# Patient Record
Sex: FEMALE | HISPANIC OR LATINO | Employment: UNEMPLOYED | URBAN - METROPOLITAN AREA
[De-identification: names, ages, dates, MRNs, and addresses within clinical notes are randomized per-mention and may not be internally consistent; named-entity substitution may affect disease eponyms.]

---

## 2022-08-25 ENCOUNTER — APPOINTMENT (OUTPATIENT)
Dept: ULTRASOUND IMAGING | Facility: CLINIC | Age: 27
End: 2022-08-25
Attending: PHYSICIAN ASSISTANT
Payer: COMMERCIAL

## 2022-08-25 ENCOUNTER — HOSPITAL ENCOUNTER (EMERGENCY)
Facility: CLINIC | Age: 27
Discharge: HOME | End: 2022-08-25
Attending: PHYSICIAN ASSISTANT | Admitting: PHYSICIAN ASSISTANT
Payer: COMMERCIAL

## 2022-08-25 VITALS
WEIGHT: 293 LBS | DIASTOLIC BLOOD PRESSURE: 77 MMHG | TEMPERATURE: 98.9 F | OXYGEN SATURATION: 98 % | RESPIRATION RATE: 18 BRPM | SYSTOLIC BLOOD PRESSURE: 117 MMHG | HEART RATE: 104 BPM

## 2022-08-25 DIAGNOSIS — R10.30 PREGNANCY WITH SUPRAPUBIC CRAMPING, ANTEPARTUM: ICD-10-CM

## 2022-08-25 DIAGNOSIS — O99.891 PREGNANCY WITH SUPRAPUBIC CRAMPING, ANTEPARTUM: ICD-10-CM

## 2022-08-25 DIAGNOSIS — Z33.1 PREGNANCY, INCIDENTAL: ICD-10-CM

## 2022-08-25 LAB
ABO/RH(D): NORMAL
ALBUMIN SERPL-MCNC: 3.4 G/DL (ref 3.4–5)
ALBUMIN UR-MCNC: NEGATIVE MG/DL
ALP SERPL-CCNC: 55 U/L (ref 40–150)
ALT SERPL W P-5'-P-CCNC: 23 U/L (ref 0–50)
ANION GAP SERPL CALCULATED.3IONS-SCNC: 5 MMOL/L (ref 3–14)
ANTIBODY SCREEN: NEGATIVE
APPEARANCE UR: CLEAR
AST SERPL W P-5'-P-CCNC: 24 U/L (ref 0–45)
B-HCG SERPL-ACNC: ABNORMAL IU/L (ref 0–5)
BASOPHILS # BLD AUTO: 0 10E3/UL (ref 0–0.2)
BASOPHILS NFR BLD AUTO: 0 %
BILIRUB SERPL-MCNC: 0.4 MG/DL (ref 0.2–1.3)
BILIRUB UR QL STRIP: NEGATIVE
BUN SERPL-MCNC: 8 MG/DL (ref 7–30)
CALCIUM SERPL-MCNC: 8.9 MG/DL (ref 8.5–10.1)
CHLORIDE BLD-SCNC: 109 MMOL/L (ref 94–109)
CO2 SERPL-SCNC: 22 MMOL/L (ref 20–32)
COLOR UR AUTO: YELLOW
CREAT SERPL-MCNC: 0.52 MG/DL (ref 0.52–1.04)
EOSINOPHIL # BLD AUTO: 0.2 10E3/UL (ref 0–0.7)
EOSINOPHIL NFR BLD AUTO: 3 %
ERYTHROCYTE [DISTWIDTH] IN BLOOD BY AUTOMATED COUNT: 12.5 % (ref 10–15)
GFR SERPL CREATININE-BSD FRML MDRD: >90 ML/MIN/1.73M2
GLUCOSE BLD-MCNC: 89 MG/DL (ref 70–99)
GLUCOSE UR STRIP-MCNC: NEGATIVE MG/DL
HCT VFR BLD AUTO: 38.2 % (ref 35–47)
HGB BLD-MCNC: 13 G/DL (ref 11.7–15.7)
HGB UR QL STRIP: ABNORMAL
HOLD SPECIMEN: NORMAL
IMM GRANULOCYTES # BLD: 0 10E3/UL
IMM GRANULOCYTES NFR BLD: 1 %
KETONES UR STRIP-MCNC: NEGATIVE MG/DL
LEUKOCYTE ESTERASE UR QL STRIP: NEGATIVE
LYMPHOCYTES # BLD AUTO: 2.4 10E3/UL (ref 0.8–5.3)
LYMPHOCYTES NFR BLD AUTO: 36 %
MCH RBC QN AUTO: 30.2 PG (ref 26.5–33)
MCHC RBC AUTO-ENTMCNC: 34 G/DL (ref 31.5–36.5)
MCV RBC AUTO: 89 FL (ref 78–100)
MONOCYTES # BLD AUTO: 0.6 10E3/UL (ref 0–1.3)
MONOCYTES NFR BLD AUTO: 9 %
MUCOUS THREADS #/AREA URNS LPF: PRESENT /LPF
NEUTROPHILS # BLD AUTO: 3.3 10E3/UL (ref 1.6–8.3)
NEUTROPHILS NFR BLD AUTO: 51 %
NITRATE UR QL: NEGATIVE
NRBC # BLD AUTO: 0 10E3/UL
NRBC BLD AUTO-RTO: 0 /100
PH UR STRIP: 6 [PH] (ref 5–7)
PLATELET # BLD AUTO: 255 10E3/UL (ref 150–450)
POTASSIUM BLD-SCNC: 4.1 MMOL/L (ref 3.4–5.3)
PROT SERPL-MCNC: 7.2 G/DL (ref 6.8–8.8)
RBC # BLD AUTO: 4.31 10E6/UL (ref 3.8–5.2)
RBC URINE: 1 /HPF
SODIUM SERPL-SCNC: 136 MMOL/L (ref 133–144)
SP GR UR STRIP: 1.01 (ref 1–1.03)
SPECIMEN EXPIRATION DATE: NORMAL
SQUAMOUS EPITHELIAL: 3 /HPF
UROBILINOGEN UR STRIP-MCNC: NORMAL MG/DL
WBC # BLD AUTO: 6.5 10E3/UL (ref 4–11)
WBC URINE: 1 /HPF

## 2022-08-25 PROCEDURE — 36415 COLL VENOUS BLD VENIPUNCTURE: CPT | Performed by: PHYSICIAN ASSISTANT

## 2022-08-25 PROCEDURE — 81001 URINALYSIS AUTO W/SCOPE: CPT | Performed by: PHYSICIAN ASSISTANT

## 2022-08-25 PROCEDURE — 80053 COMPREHEN METABOLIC PANEL: CPT | Performed by: PHYSICIAN ASSISTANT

## 2022-08-25 PROCEDURE — 99284 EMERGENCY DEPT VISIT MOD MDM: CPT | Mod: 25 | Performed by: PHYSICIAN ASSISTANT

## 2022-08-25 PROCEDURE — 85041 AUTOMATED RBC COUNT: CPT | Performed by: PHYSICIAN ASSISTANT

## 2022-08-25 PROCEDURE — 82040 ASSAY OF SERUM ALBUMIN: CPT | Performed by: PHYSICIAN ASSISTANT

## 2022-08-25 PROCEDURE — 86901 BLOOD TYPING SEROLOGIC RH(D): CPT | Performed by: PHYSICIAN ASSISTANT

## 2022-08-25 PROCEDURE — 76801 OB US < 14 WKS SINGLE FETUS: CPT

## 2022-08-25 PROCEDURE — 99284 EMERGENCY DEPT VISIT MOD MDM: CPT | Performed by: PHYSICIAN ASSISTANT

## 2022-08-25 PROCEDURE — 84702 CHORIONIC GONADOTROPIN TEST: CPT | Performed by: PHYSICIAN ASSISTANT

## 2022-08-25 ASSESSMENT — ACTIVITIES OF DAILY LIVING (ADL)
ADLS_ACUITY_SCORE: 35
ADLS_ACUITY_SCORE: 33

## 2022-08-25 NOTE — ED TRIAGE NOTES
Pt presents 8 wks pregnant with abdominal cramps, pain 2/10. States brownish stringy discharge, and brown streaks with wiping. Pt states symptoms started Tuesday. Mild SOB related to asthma and seasonal allergies.

## 2022-08-25 NOTE — DISCHARGE INSTRUCTIONS
It was a pleasure working with you today!  I hope your condition improves rapidly!     Thankfully, all of your testing was very reassuring and looked great.  No baby complications at this time.  Please rest the next couple days.  Avoid lifting anything over 5 pounds.  Avoid any aerobic exercise.  Your uterus is likely responding to recent baby growth and you are experiencing some round ligament type discomfort.  Please return to the emergency department if you get vaginal bleeding, increasing pain, fever, or chills.

## 2022-08-26 NOTE — ED PROVIDER NOTES
History     Chief Complaint   Patient presents with     Abdominal Pain     HPI  Modesta Bill is a 26 year old female currently about 8 weeks gestation based on LMP of 6/28/2022 who presents for evaluation of 2.5 days of minor lower abdominal cramping and occasional small specks of brown discharge with wiping during urination.  She denies any bleeding from the vagina.  She has had some first trimester nausea but no vomiting.  She denies any recent fevers or chills.  Denies any consistent abdominal pain.  She denies any dysuria, frequency, urgency, flank pain, or gross hematuria.  Last bowel movement was last night and she generally goes daily or twice daily.  She is G2, P0.  First pregnancy ended in the very early trimester due to miscarriage.  She is unsure what blood type she has.  She and her significant other were not trying for pregnancy, but also were not preventing against it.  She has not taken any medication to treat her symptoms.      OB History   No obstetric history on file.        Allergies:  Allergies   Allergen Reactions     Hydrocodone-Acetaminophen Dizziness       Problem List:    There are no problems to display for this patient.       Past Medical History:    No past medical history on file.    Past Surgical History:    No past surgical history on file.    Family History:    No family history on file.    Social History:  Marital Status:  Single [1]        Medications:    No current outpatient medications on file.        Review of Systems   All other systems reviewed and are negative.      Physical Exam   BP: 114/88  Pulse: 104  Temp: 98.9  F (37.2  C)  Resp: 18  Weight: 133.8 kg (294 lb 14.4 oz)  SpO2: 98 %      Physical Exam  Vitals and nursing note reviewed.   Constitutional:       General: She is not in acute distress.     Appearance: She is not diaphoretic.   HENT:      Head: Normocephalic and atraumatic.      Right Ear: External ear normal.      Left Ear: External ear normal.       Nose: Nose normal.      Mouth/Throat:      Pharynx: No oropharyngeal exudate.   Eyes:      General: No scleral icterus.        Right eye: No discharge.         Left eye: No discharge.      Conjunctiva/sclera: Conjunctivae normal.      Pupils: Pupils are equal, round, and reactive to light.   Neck:      Thyroid: No thyromegaly.   Cardiovascular:      Rate and Rhythm: Normal rate and regular rhythm.      Heart sounds: Normal heart sounds. No murmur heard.  Pulmonary:      Effort: Pulmonary effort is normal. No respiratory distress.      Breath sounds: Normal breath sounds. No wheezing or rales.   Chest:      Chest wall: No tenderness.   Abdominal:      General: Bowel sounds are normal. There is no distension.      Palpations: Abdomen is soft. There is no mass.      Tenderness: There is no abdominal tenderness. There is no right CVA tenderness, left CVA tenderness, guarding or rebound. Negative signs include Flores's sign and Rovsing's sign.      Hernia: There is no hernia in the umbilical area or ventral area.   Musculoskeletal:         General: No tenderness or deformity. Normal range of motion.      Cervical back: Normal range of motion and neck supple.   Lymphadenopathy:      Cervical: No cervical adenopathy.   Skin:     General: Skin is warm and dry.      Capillary Refill: Capillary refill takes less than 2 seconds.      Findings: No erythema or rash.   Neurological:      Mental Status: She is alert and oriented to person, place, and time.      Cranial Nerves: No cranial nerve deficit.   Psychiatric:         Behavior: Behavior normal.         Thought Content: Thought content normal.         ED Course                 Procedures              Critical Care time:  none               Results for orders placed or performed during the hospital encounter of 08/25/22 (from the past 24 hour(s))   CBC with platelets differential    Narrative    The following orders were created for panel order CBC with platelets  differential.  Procedure                               Abnormality         Status                     ---------                               -----------         ------                     CBC with platelets and d...[[id number]]                      Final result                 Please view results for these tests on the individual orders.   Comprehensive metabolic panel   Result Value Ref Range    Sodium 136 133 - 144 mmol/L    Potassium 4.1 3.4 - 5.3 mmol/L    Chloride 109 94 - 109 mmol/L    Carbon Dioxide (CO2) 22 20 - 32 mmol/L    Anion Gap 5 3 - 14 mmol/L    Urea Nitrogen 8 7 - 30 mg/dL    Creatinine 0.52 0.52 - 1.04 mg/dL    Calcium 8.9 8.5 - 10.1 mg/dL    Glucose 89 70 - 99 mg/dL    Alkaline Phosphatase 55 40 - 150 U/L    AST 24 0 - 45 U/L    ALT 23 0 - 50 U/L    Protein Total 7.2 6.8 - 8.8 g/dL    Albumin 3.4 3.4 - 5.0 g/dL    Bilirubin Total 0.4 0.2 - 1.3 mg/dL    GFR Estimate >90 >60 mL/min/1.73m2   HCG quantitative pregnancy   Result Value Ref Range    hCG Quantitative 68,607 (H) 0 - 5 IU/L   CBC with platelets and differential   Result Value Ref Range    WBC Count 6.5 4.0 - 11.0 10e3/uL    RBC Count 4.31 3.80 - 5.20 10e6/uL    Hemoglobin 13.0 11.7 - 15.7 g/dL    Hematocrit 38.2 35.0 - 47.0 %    MCV 89 78 - 100 fL    MCH 30.2 26.5 - 33.0 pg    MCHC 34.0 31.5 - 36.5 g/dL    RDW 12.5 10.0 - 15.0 %    Platelet Count 255 150 - 450 10e3/uL    % Neutrophils 51 %    % Lymphocytes 36 %    % Monocytes 9 %    % Eosinophils 3 %    % Basophils 0 %    % Immature Granulocytes 1 %    NRBCs per 100 WBC 0 <1 /100    Absolute Neutrophils 3.3 1.6 - 8.3 10e3/uL    Absolute Lymphocytes 2.4 0.8 - 5.3 10e3/uL    Absolute Monocytes 0.6 0.0 - 1.3 10e3/uL    Absolute Eosinophils 0.2 0.0 - 0.7 10e3/uL    Absolute Basophils 0.0 0.0 - 0.2 10e3/uL    Absolute Immature Granulocytes 0.0 <=0.4 10e3/uL    Absolute NRBCs 0.0 10e3/uL   UA with Microscopic reflex to Culture    Specimen: Urine, Clean Catch   Result Value Ref Range    Color  Urine Yellow Colorless, Straw, Light Yellow, Yellow    Appearance Urine Clear Clear    Glucose Urine Negative Negative mg/dL    Bilirubin Urine Negative Negative    Ketones Urine Negative Negative mg/dL    Specific Gravity Urine 1.010 1.003 - 1.035    Blood Urine Trace (A) Negative    pH Urine 6.0 5.0 - 7.0    Protein Albumin Urine Negative Negative mg/dL    Urobilinogen Urine Normal Normal, 2.0 mg/dL    Nitrite Urine Negative Negative    Leukocyte Esterase Urine Negative Negative    Mucus Urine Present (A) None Seen /LPF    RBC Urine 1 <=2 /HPF    WBC Urine 1 <=5 /HPF    Squamous Epithelials Urine 3 (H) <=1 /HPF    Narrative    Urine Culture not indicated   ABO/Rh type and screen    Narrative    The following orders were created for panel order ABO/Rh type and screen.  Procedure                               Abnormality         Status                     ---------                               -----------         ------                     Adult Type and Screen[[id number]]                            Final result                 Please view results for these tests on the individual orders.   Adult Type and Screen   Result Value Ref Range    ABO/RH(D) O POS     Antibody Screen Negative Negative    SPECIMEN EXPIRATION DATE [id number]    Extra Tube    Narrative    The following orders were created for panel order Extra Tube.  Procedure                               Abnormality         Status                     ---------                               -----------         ------                     Extra Purple Top Tube[[id number]]                            Final result                 Please view results for these tests on the individual orders.   Extra Purple Top Tube   Result Value Ref Range    Hold Specimen [facility]    US OB <14 Weeks W Transvaginal    Narrative    US OB <14 WEEKS WITH TRANSVAGINAL SINGLE 8/25/2022 12:15 PM    CLINICAL HISTORY: cramping, change in discharge, 8 weeks gestation by  LMP  TECHNIQUE:  Transabdominal scans were performed. Endovaginal ultrasound  was performed to better visualize the embryo.    COMPARISON: None.    FINDINGS:  UTERUS: Single normal appearing intrauterine gestation sac.  CRL: measures 17 mm, equals 8 weeks 2 days.  FETAL HEART RATE: 153 bpm.  AMNIOTIC FLUID: Normal.  PLACENTA: Not yet formed. No evidence for sub-chorionic hemorrhage.    RIGHT OVARY: Normal.  LEFT OVARY: Normal and contains a corpus luteum.      Impression    IMPRESSION:   1.  Single living intrauterine gestation at 8 weeks 2 days, EDC  04/04/2022.      EVA JORDAN MD         SYSTEM ID:  [id number]       Medications - No data to display    Assessments & Plan (with Medical Decision Making)     Pregnancy, incidental  Pregnancy with suprapubic cramping, antepartum     26 year old female currently 8 weeks 2 days gestation according to LMP who presents for evaluation of minor bilateral lower  abdominal cramping and a couple episodes of some slight brown color to her vaginal mucus recently.  Nausea in her first trimester but no vomiting.  No other abnormal symptoms.  See HPI above for details.  Unsure of her blood type.  On exam blood pressure 117/77, temperature 98.9, pulse 104, respiration 18, oxygen saturation 98% on room air.  Patient is in no acute distress.  No abdominal discomfort.  No abnormalities on exam otherwise as noted above.  IV was established.  Laboratory levels all stable with normal CBC, comprehensive metabolic panel, appropriately elevated hCG at 68,000 607, and urinalysis.  She is O+ blood type with negative antibodies.  Ultrasound with single living intrauterine gestation at 8 weeks 2 days.  No subchorionic hemorrhage.  No abnormalities.  Patient was reassured.  This likely represents round ligament discomfort.  She has a viable pregnancy.  She did show me a picture of the brown discharge that she was experiencing, and I think this is more likely physiologic discharge.  There is no sign of bleeding.   Reasons for ED return reviewed.  She does have a follow-up appointment with her OB provider in 2 weeks and she will keep that appointment.  Patient was in agreement with this plan was suitable for discharge.     I have reviewed the nursing notes.    I have reviewed the findings, diagnosis, plan and need for follow up with the patient.       There are no discharge medications for this patient.      Final diagnoses:   Pregnancy, incidental   Pregnancy with suprapubic cramping, antepartum     Disclaimer: This note consists of symbols derived from keyboarding, dictation and/or voice recognition software. As a result, there may be errors in the script that have gone undetected. Please consider this when interpreting information found in this chart.      8/25/2022   [facility] EMERGENCY DEPT     Hari Rees PA-C  08/25/22 [id number]

## 2022-11-17 ENCOUNTER — TRANSFERRED RECORDS (OUTPATIENT)
Dept: HEALTH INFORMATION MANAGEMENT | Facility: CLINIC | Age: 27
End: 2022-11-17

## 2023-02-20 ENCOUNTER — TRANSFERRED RECORDS (OUTPATIENT)
Dept: HEALTH INFORMATION MANAGEMENT | Facility: CLINIC | Age: 28
End: 2023-02-20

## 2023-04-06 ENCOUNTER — TRANSFERRED RECORDS (OUTPATIENT)
Dept: HEALTH INFORMATION MANAGEMENT | Facility: CLINIC | Age: 28
End: 2023-04-06

## 2023-04-22 ENCOUNTER — ANESTHESIA EVENT (OUTPATIENT)
Dept: OBGYN | Facility: CLINIC | Age: 28
End: 2023-04-22
Payer: COMMERCIAL

## 2023-04-22 ENCOUNTER — ANESTHESIA (OUTPATIENT)
Dept: OBGYN | Facility: CLINIC | Age: 28
End: 2023-04-22
Payer: COMMERCIAL

## 2023-04-22 ENCOUNTER — HOSPITAL ENCOUNTER (INPATIENT)
Facility: CLINIC | Age: 28
LOS: 2 days | Discharge: OTHER INSTITUTION | End: 2023-04-24
Attending: FAMILY MEDICINE | Admitting: FAMILY MEDICINE
Payer: COMMERCIAL

## 2023-04-22 ENCOUNTER — TRANSFERRED RECORDS (OUTPATIENT)
Dept: HEALTH INFORMATION MANAGEMENT | Facility: CLINIC | Age: 28
End: 2023-04-22

## 2023-04-22 DIAGNOSIS — O48.0 POST TERM PREGNANCY, ANTEPARTUM CONDITION OR COMPLICATION: ICD-10-CM

## 2023-04-22 DIAGNOSIS — E66.01 MORBID OBESITY (H): ICD-10-CM

## 2023-04-22 DIAGNOSIS — O14.90 PRE-ECLAMPSIA AFFECTING PREGNANCY, ANTEPARTUM: Primary | ICD-10-CM

## 2023-04-22 DIAGNOSIS — O42.90 PROLONGED RUPTURE OF MEMBRANES: ICD-10-CM

## 2023-04-22 PROBLEM — Z34.90 TERM PREGNANCY: Status: RESOLVED | Noted: 2023-04-22 | Resolved: 2023-04-22

## 2023-04-22 PROBLEM — Z34.90 TERM PREGNANCY: Status: ACTIVE | Noted: 2023-04-22

## 2023-04-22 LAB
ABO/RH(D): NORMAL
ALBUMIN MFR UR ELPH: 12.3 MG/DL (ref 1–14)
ALBUMIN SERPL BCG-MCNC: 3.2 G/DL (ref 3.5–5.2)
ALBUMIN SERPL BCG-MCNC: 3.3 G/DL (ref 3.5–5.2)
ALP SERPL-CCNC: 273 U/L (ref 35–104)
ALP SERPL-CCNC: 305 U/L (ref 35–104)
ALT SERPL W P-5'-P-CCNC: 12 U/L (ref 10–35)
ALT SERPL W P-5'-P-CCNC: 14 U/L (ref 10–35)
ANION GAP SERPL CALCULATED.3IONS-SCNC: 14 MMOL/L (ref 7–15)
ANION GAP SERPL CALCULATED.3IONS-SCNC: 15 MMOL/L (ref 7–15)
ANTIBODY SCREEN: NEGATIVE
AST SERPL W P-5'-P-CCNC: 21 U/L (ref 10–35)
AST SERPL W P-5'-P-CCNC: 22 U/L (ref 10–35)
BILIRUB SERPL-MCNC: 0.2 MG/DL
BILIRUB SERPL-MCNC: 0.3 MG/DL
BUN SERPL-MCNC: 10.3 MG/DL (ref 6–20)
BUN SERPL-MCNC: 10.9 MG/DL (ref 6–20)
CALCIUM SERPL-MCNC: 9.5 MG/DL (ref 8.6–10)
CALCIUM SERPL-MCNC: 9.8 MG/DL (ref 8.6–10)
CHLORIDE SERPL-SCNC: 102 MMOL/L (ref 98–107)
CHLORIDE SERPL-SCNC: 104 MMOL/L (ref 98–107)
CREAT SERPL-MCNC: 0.8 MG/DL (ref 0.51–0.95)
CREAT SERPL-MCNC: 0.8 MG/DL (ref 0.51–0.95)
CREAT UR-MCNC: 55.9 MG/DL
DEPRECATED HCO3 PLAS-SCNC: 18 MMOL/L (ref 22–29)
DEPRECATED HCO3 PLAS-SCNC: 19 MMOL/L (ref 22–29)
ERYTHROCYTE [DISTWIDTH] IN BLOOD BY AUTOMATED COUNT: 13.9 % (ref 10–15)
GFR SERPL CREATININE-BSD FRML MDRD: >90 ML/MIN/1.73M2
GFR SERPL CREATININE-BSD FRML MDRD: >90 ML/MIN/1.73M2
GLUCOSE SERPL-MCNC: 72 MG/DL (ref 70–99)
GLUCOSE SERPL-MCNC: 89 MG/DL (ref 70–99)
HCT VFR BLD AUTO: 36 % (ref 35–47)
HGB BLD-MCNC: 12 G/DL (ref 11.7–15.7)
MAGNESIUM SERPL-MCNC: 1.9 MG/DL (ref 1.7–2.3)
MCH RBC QN AUTO: 29.6 PG (ref 26.5–33)
MCHC RBC AUTO-ENTMCNC: 33.3 G/DL (ref 31.5–36.5)
MCV RBC AUTO: 89 FL (ref 78–100)
PLATELET # BLD AUTO: 196 10E3/UL (ref 150–450)
POTASSIUM SERPL-SCNC: 3.9 MMOL/L (ref 3.4–5.3)
POTASSIUM SERPL-SCNC: 4 MMOL/L (ref 3.4–5.3)
PROT SERPL-MCNC: 6.4 G/DL (ref 6.4–8.3)
PROT SERPL-MCNC: 6.9 G/DL (ref 6.4–8.3)
PROT/CREAT 24H UR: 0.22 MG/MG CR (ref 0–0.2)
RBC # BLD AUTO: 4.06 10E6/UL (ref 3.8–5.2)
SODIUM SERPL-SCNC: 134 MMOL/L (ref 136–145)
SODIUM SERPL-SCNC: 138 MMOL/L (ref 136–145)
SPECIMEN EXPIRATION DATE: NORMAL
WBC # BLD AUTO: 14.7 10E3/UL (ref 4–11)

## 2023-04-22 PROCEDURE — 250N000011 HC RX IP 250 OP 636: Performed by: FAMILY MEDICINE

## 2023-04-22 PROCEDURE — 120N000001 HC R&B MED SURG/OB

## 2023-04-22 PROCEDURE — C9290 INJ, BUPIVACAINE LIPOSOME: HCPCS | Performed by: NURSE ANESTHETIST, CERTIFIED REGISTERED

## 2023-04-22 PROCEDURE — 258N000003 HC RX IP 258 OP 636: Performed by: FAMILY MEDICINE

## 2023-04-22 PROCEDURE — 370N000017 HC ANESTHESIA TECHNICAL FEE, PER MIN: Performed by: FAMILY MEDICINE

## 2023-04-22 PROCEDURE — 250N000013 HC RX MED GY IP 250 OP 250 PS 637: Performed by: FAMILY MEDICINE

## 2023-04-22 PROCEDURE — 360N000076 HC SURGERY LEVEL 3, PER MIN: Performed by: FAMILY MEDICINE

## 2023-04-22 PROCEDURE — 86780 TREPONEMA PALLIDUM: CPT | Performed by: FAMILY MEDICINE

## 2023-04-22 PROCEDURE — 271N000001 HC OR GENERAL SUPPLY NON-STERILE: Performed by: FAMILY MEDICINE

## 2023-04-22 PROCEDURE — 272N000001 HC OR GENERAL SUPPLY STERILE: Performed by: FAMILY MEDICINE

## 2023-04-22 PROCEDURE — 59514 CESAREAN DELIVERY ONLY: CPT | Performed by: FAMILY MEDICINE

## 2023-04-22 PROCEDURE — 84155 ASSAY OF PROTEIN SERUM: CPT | Performed by: FAMILY MEDICINE

## 2023-04-22 PROCEDURE — 250N000011 HC RX IP 250 OP 636: Performed by: NURSE ANESTHETIST, CERTIFIED REGISTERED

## 2023-04-22 PROCEDURE — 59514 CESAREAN DELIVERY ONLY: CPT | Mod: 80 | Performed by: FAMILY MEDICINE

## 2023-04-22 PROCEDURE — 86901 BLOOD TYPING SEROLOGIC RH(D): CPT | Performed by: FAMILY MEDICINE

## 2023-04-22 PROCEDURE — 84450 TRANSFERASE (AST) (SGOT): CPT | Performed by: FAMILY MEDICINE

## 2023-04-22 PROCEDURE — 258N000003 HC RX IP 258 OP 636: Performed by: NURSE ANESTHETIST, CERTIFIED REGISTERED

## 2023-04-22 PROCEDURE — 710N000010 HC RECOVERY PHASE 1, LEVEL 2, PER MIN: Performed by: FAMILY MEDICINE

## 2023-04-22 PROCEDURE — 84156 ASSAY OF PROTEIN URINE: CPT | Performed by: FAMILY MEDICINE

## 2023-04-22 PROCEDURE — 86850 RBC ANTIBODY SCREEN: CPT | Performed by: FAMILY MEDICINE

## 2023-04-22 PROCEDURE — 83735 ASSAY OF MAGNESIUM: CPT | Performed by: FAMILY MEDICINE

## 2023-04-22 PROCEDURE — 36415 COLL VENOUS BLD VENIPUNCTURE: CPT | Performed by: FAMILY MEDICINE

## 2023-04-22 PROCEDURE — 85027 COMPLETE CBC AUTOMATED: CPT | Performed by: FAMILY MEDICINE

## 2023-04-22 PROCEDURE — 250N000009 HC RX 250: Performed by: NURSE ANESTHETIST, CERTIFIED REGISTERED

## 2023-04-22 RX ORDER — MISOPROSTOL 200 UG/1
400 TABLET ORAL
Status: DISCONTINUED | OUTPATIENT
Start: 2023-04-22 | End: 2023-04-22

## 2023-04-22 RX ORDER — HYDRALAZINE HYDROCHLORIDE 20 MG/ML
5-10 INJECTION INTRAMUSCULAR; INTRAVENOUS
Status: DISCONTINUED | OUTPATIENT
Start: 2023-04-22 | End: 2023-04-24 | Stop reason: HOSPADM

## 2023-04-22 RX ORDER — NALOXONE HYDROCHLORIDE 0.4 MG/ML
0.4 INJECTION, SOLUTION INTRAMUSCULAR; INTRAVENOUS; SUBCUTANEOUS
Status: DISCONTINUED | OUTPATIENT
Start: 2023-04-22 | End: 2023-04-22

## 2023-04-22 RX ORDER — PRENATAL VIT/IRON FUM/FOLIC AC 27MG-0.8MG
1 TABLET ORAL DAILY
Status: DISCONTINUED | OUTPATIENT
Start: 2023-04-23 | End: 2023-04-22

## 2023-04-22 RX ORDER — ALBUTEROL SULFATE 90 UG/1
2 AEROSOL, METERED RESPIRATORY (INHALATION) EVERY 6 HOURS PRN
COMMUNITY

## 2023-04-22 RX ORDER — KETOROLAC TROMETHAMINE 30 MG/ML
30 INJECTION, SOLUTION INTRAMUSCULAR; INTRAVENOUS
Status: DISCONTINUED | OUTPATIENT
Start: 2023-04-22 | End: 2023-04-22

## 2023-04-22 RX ORDER — MAGNESIUM SULFATE HEPTAHYDRATE 40 MG/ML
4 INJECTION, SOLUTION INTRAVENOUS ONCE
Status: COMPLETED | OUTPATIENT
Start: 2023-04-22 | End: 2023-04-22

## 2023-04-22 RX ORDER — CALCIUM GLUCONATE 94 MG/ML
1 INJECTION, SOLUTION INTRAVENOUS
Status: DISCONTINUED | OUTPATIENT
Start: 2023-04-22 | End: 2023-04-24 | Stop reason: HOSPADM

## 2023-04-22 RX ORDER — OXYTOCIN/0.9 % SODIUM CHLORIDE 30/500 ML
1-24 PLASTIC BAG, INJECTION (ML) INTRAVENOUS CONTINUOUS
Status: DISCONTINUED | OUTPATIENT
Start: 2023-04-22 | End: 2023-04-22

## 2023-04-22 RX ORDER — OXYTOCIN/0.9 % SODIUM CHLORIDE 30/500 ML
340 PLASTIC BAG, INJECTION (ML) INTRAVENOUS CONTINUOUS PRN
Status: DISCONTINUED | OUTPATIENT
Start: 2023-04-22 | End: 2023-04-22

## 2023-04-22 RX ORDER — OXYTOCIN 10 [USP'U]/ML
10 INJECTION, SOLUTION INTRAMUSCULAR; INTRAVENOUS
Status: DISCONTINUED | OUTPATIENT
Start: 2023-04-22 | End: 2023-04-22

## 2023-04-22 RX ORDER — NALOXONE HYDROCHLORIDE 0.4 MG/ML
0.2 INJECTION, SOLUTION INTRAMUSCULAR; INTRAVENOUS; SUBCUTANEOUS
Status: DISCONTINUED | OUTPATIENT
Start: 2023-04-22 | End: 2023-04-22

## 2023-04-22 RX ORDER — OXYTOCIN/0.9 % SODIUM CHLORIDE 30/500 ML
PLASTIC BAG, INJECTION (ML) INTRAVENOUS CONTINUOUS PRN
Status: DISCONTINUED | OUTPATIENT
Start: 2023-04-22 | End: 2023-04-22

## 2023-04-22 RX ORDER — IBUPROFEN 800 MG/1
800 TABLET, FILM COATED ORAL
Status: DISCONTINUED | OUTPATIENT
Start: 2023-04-22 | End: 2023-04-22

## 2023-04-22 RX ORDER — FENTANYL CITRATE 50 UG/ML
50 INJECTION, SOLUTION INTRAMUSCULAR; INTRAVENOUS EVERY 5 MIN PRN
Status: DISCONTINUED | OUTPATIENT
Start: 2023-04-22 | End: 2023-04-23

## 2023-04-22 RX ORDER — LIDOCAINE 40 MG/G
CREAM TOPICAL
Status: DISCONTINUED | OUTPATIENT
Start: 2023-04-22 | End: 2023-04-22

## 2023-04-22 RX ORDER — MAGNESIUM SULFATE HEPTAHYDRATE 40 MG/ML
2 INJECTION, SOLUTION INTRAVENOUS
Status: DISCONTINUED | OUTPATIENT
Start: 2023-04-22 | End: 2023-04-23

## 2023-04-22 RX ORDER — ONDANSETRON 2 MG/ML
4 INJECTION INTRAMUSCULAR; INTRAVENOUS EVERY 30 MIN PRN
Status: DISCONTINUED | OUTPATIENT
Start: 2023-04-22 | End: 2023-04-23

## 2023-04-22 RX ORDER — SODIUM CHLORIDE, SODIUM LACTATE, POTASSIUM CHLORIDE, CALCIUM CHLORIDE 600; 310; 30; 20 MG/100ML; MG/100ML; MG/100ML; MG/100ML
INJECTION, SOLUTION INTRAVENOUS CONTINUOUS
Status: DISCONTINUED | OUTPATIENT
Start: 2023-04-22 | End: 2023-04-22

## 2023-04-22 RX ORDER — METOCLOPRAMIDE HYDROCHLORIDE 5 MG/ML
10 INJECTION INTRAMUSCULAR; INTRAVENOUS EVERY 6 HOURS PRN
Status: DISCONTINUED | OUTPATIENT
Start: 2023-04-22 | End: 2023-04-22

## 2023-04-22 RX ORDER — ACETAMINOPHEN 325 MG/1
975 TABLET ORAL ONCE
Status: COMPLETED | OUTPATIENT
Start: 2023-04-22 | End: 2023-04-22

## 2023-04-22 RX ORDER — PENICILLIN G 3000000 [IU]/50ML
3 INJECTION, SOLUTION INTRAVENOUS EVERY 4 HOURS
Status: DISCONTINUED | OUTPATIENT
Start: 2023-04-22 | End: 2023-04-22

## 2023-04-22 RX ORDER — CITRIC ACID/SODIUM CITRATE 334-500MG
30 SOLUTION, ORAL ORAL
Status: DISCONTINUED | OUTPATIENT
Start: 2023-04-22 | End: 2023-04-22

## 2023-04-22 RX ORDER — OXYTOCIN/0.9 % SODIUM CHLORIDE 30/500 ML
100-340 PLASTIC BAG, INJECTION (ML) INTRAVENOUS CONTINUOUS PRN
Status: CANCELLED | OUTPATIENT
Start: 2023-04-22

## 2023-04-22 RX ORDER — METHYLERGONOVINE MALEATE 0.2 MG/ML
200 INJECTION INTRAVENOUS
Status: DISCONTINUED | OUTPATIENT
Start: 2023-04-22 | End: 2023-04-22

## 2023-04-22 RX ORDER — LABETALOL HYDROCHLORIDE 5 MG/ML
20-40 INJECTION, SOLUTION INTRAVENOUS EVERY 10 MIN PRN
Status: DISCONTINUED | OUTPATIENT
Start: 2023-04-22 | End: 2023-04-24 | Stop reason: HOSPADM

## 2023-04-22 RX ORDER — LABETALOL HYDROCHLORIDE 5 MG/ML
20-40 INJECTION, SOLUTION INTRAVENOUS EVERY 10 MIN PRN
Status: DISCONTINUED | OUTPATIENT
Start: 2023-04-22 | End: 2023-04-22

## 2023-04-22 RX ORDER — MAGNESIUM SULFATE IN WATER 40 MG/ML
2 INJECTION, SOLUTION INTRAVENOUS CONTINUOUS
Status: DISCONTINUED | OUTPATIENT
Start: 2023-04-23 | End: 2023-04-24 | Stop reason: HOSPADM

## 2023-04-22 RX ORDER — CARBOPROST TROMETHAMINE 250 UG/ML
250 INJECTION, SOLUTION INTRAMUSCULAR
Status: DISCONTINUED | OUTPATIENT
Start: 2023-04-22 | End: 2023-04-22

## 2023-04-22 RX ORDER — ONDANSETRON 2 MG/ML
INJECTION INTRAMUSCULAR; INTRAVENOUS PRN
Status: DISCONTINUED | OUTPATIENT
Start: 2023-04-22 | End: 2023-04-22

## 2023-04-22 RX ORDER — SODIUM CHLORIDE, SODIUM LACTATE, POTASSIUM CHLORIDE, CALCIUM CHLORIDE 600; 310; 30; 20 MG/100ML; MG/100ML; MG/100ML; MG/100ML
INJECTION, SOLUTION INTRAVENOUS CONTINUOUS
Status: DISCONTINUED | OUTPATIENT
Start: 2023-04-23 | End: 2023-04-23

## 2023-04-22 RX ORDER — FENTANYL CITRATE 50 UG/ML
INJECTION, SOLUTION INTRAMUSCULAR; INTRAVENOUS PRN
Status: DISCONTINUED | OUTPATIENT
Start: 2023-04-22 | End: 2023-04-22

## 2023-04-22 RX ORDER — MAGNESIUM SULFATE IN WATER 40 MG/ML
2 INJECTION, SOLUTION INTRAVENOUS CONTINUOUS
Status: DISCONTINUED | OUTPATIENT
Start: 2023-04-22 | End: 2023-04-23

## 2023-04-22 RX ORDER — FENTANYL CITRATE-0.9 % NACL/PF 10 MCG/ML
PLASTIC BAG, INJECTION (ML) INTRAVENOUS CONTINUOUS PRN
Status: DISCONTINUED | OUTPATIENT
Start: 2023-04-22 | End: 2023-04-22

## 2023-04-22 RX ORDER — CALCIUM CARBONATE 500 MG/1
500 TABLET, CHEWABLE ORAL DAILY PRN
Status: DISCONTINUED | OUTPATIENT
Start: 2023-04-22 | End: 2023-04-24 | Stop reason: HOSPADM

## 2023-04-22 RX ORDER — CITRIC ACID/SODIUM CITRATE 334-500MG
30 SOLUTION, ORAL ORAL
Status: COMPLETED | OUTPATIENT
Start: 2023-04-22 | End: 2023-04-22

## 2023-04-22 RX ORDER — PROCHLORPERAZINE 25 MG
25 SUPPOSITORY, RECTAL RECTAL EVERY 12 HOURS PRN
Status: DISCONTINUED | OUTPATIENT
Start: 2023-04-22 | End: 2023-04-22

## 2023-04-22 RX ORDER — HYDRALAZINE HYDROCHLORIDE 20 MG/ML
10 INJECTION INTRAMUSCULAR; INTRAVENOUS
Status: DISCONTINUED | OUTPATIENT
Start: 2023-04-22 | End: 2023-04-23

## 2023-04-22 RX ORDER — ONDANSETRON 4 MG/1
4 TABLET, ORALLY DISINTEGRATING ORAL EVERY 6 HOURS PRN
Status: DISCONTINUED | OUTPATIENT
Start: 2023-04-22 | End: 2023-04-22

## 2023-04-22 RX ORDER — BUPIVACAINE HYDROCHLORIDE 2.5 MG/ML
INJECTION, SOLUTION EPIDURAL; INFILTRATION; INTRACAUDAL PRN
Status: DISCONTINUED | OUTPATIENT
Start: 2023-04-22 | End: 2023-04-22

## 2023-04-22 RX ORDER — TRANEXAMIC ACID 10 MG/ML
1 INJECTION, SOLUTION INTRAVENOUS EVERY 30 MIN PRN
Status: DISCONTINUED | OUTPATIENT
Start: 2023-04-22 | End: 2023-04-22

## 2023-04-22 RX ORDER — HYDRALAZINE HYDROCHLORIDE 20 MG/ML
5-10 INJECTION INTRAMUSCULAR; INTRAVENOUS
Status: DISCONTINUED | OUTPATIENT
Start: 2023-04-22 | End: 2023-04-22

## 2023-04-22 RX ORDER — CEFAZOLIN SODIUM/WATER 3 G/30 ML
3 SYRINGE (ML) INTRAVENOUS
Status: COMPLETED | OUTPATIENT
Start: 2023-04-22 | End: 2023-04-22

## 2023-04-22 RX ORDER — PENICILLIN G POTASSIUM 5000000 [IU]/1
5 INJECTION, POWDER, FOR SOLUTION INTRAMUSCULAR; INTRAVENOUS ONCE
Status: COMPLETED | OUTPATIENT
Start: 2023-04-22 | End: 2023-04-22

## 2023-04-22 RX ORDER — OXYTOCIN 10 [USP'U]/ML
10 INJECTION, SOLUTION INTRAMUSCULAR; INTRAVENOUS
Status: CANCELLED | OUTPATIENT
Start: 2023-04-22

## 2023-04-22 RX ORDER — LABETALOL HYDROCHLORIDE 5 MG/ML
20-40 INJECTION, SOLUTION INTRAVENOUS EVERY 10 MIN PRN
Status: DISCONTINUED | OUTPATIENT
Start: 2023-04-22 | End: 2023-04-23

## 2023-04-22 RX ORDER — HYDROXYZINE HYDROCHLORIDE 25 MG/1
25 TABLET, FILM COATED ORAL EVERY 6 HOURS PRN
Status: DISCONTINUED | OUTPATIENT
Start: 2023-04-22 | End: 2023-04-23

## 2023-04-22 RX ORDER — DIMENHYDRINATE 50 MG/ML
25 INJECTION, SOLUTION INTRAMUSCULAR; INTRAVENOUS
Status: DISCONTINUED | OUTPATIENT
Start: 2023-04-22 | End: 2023-04-23

## 2023-04-22 RX ORDER — FENTANYL CITRATE 50 UG/ML
25 INJECTION, SOLUTION INTRAMUSCULAR; INTRAVENOUS EVERY 5 MIN PRN
Status: DISCONTINUED | OUTPATIENT
Start: 2023-04-22 | End: 2023-04-23

## 2023-04-22 RX ORDER — PROCHLORPERAZINE MALEATE 5 MG
10 TABLET ORAL EVERY 6 HOURS PRN
Status: DISCONTINUED | OUTPATIENT
Start: 2023-04-22 | End: 2023-04-22

## 2023-04-22 RX ORDER — CALCIUM GLUCONATE 94 MG/ML
1 INJECTION, SOLUTION INTRAVENOUS
Status: DISCONTINUED | OUTPATIENT
Start: 2023-04-22 | End: 2023-04-23

## 2023-04-22 RX ORDER — VITAMIN A ACETATE, .BETA.-CAROTENE, ASCORBIC ACID, CHOLECALCIFEROL, .ALPHA.-TOCOPHEROL ACETATE, DL-, THIAMINE MONONITRATE, RIBOFLAVIN, NIACINAMIDE, PYRIDOXINE HYDROCHLORIDE, FOLIC ACID, CYANOCOBALAMIN, CALCIUM CARBONATE, FERROUS FUMARATE, ZINC OXIDE, AND CUPRIC OXIDE 2000; 2000; 120; 400; 22; 1.84; 3; 20; 10; 1; 12; 200; 27; 25; 2 [IU]/1; [IU]/1; MG/1; [IU]/1; MG/1; MG/1; MG/1; MG/1; MG/1; MG/1; UG/1; MG/1; MG/1; MG/1; MG/1
1 TABLET ORAL DAILY
COMMUNITY

## 2023-04-22 RX ORDER — AZITHROMYCIN 500 MG/1
500 INJECTION, POWDER, LYOPHILIZED, FOR SOLUTION INTRAVENOUS
Status: COMPLETED | OUTPATIENT
Start: 2023-04-22 | End: 2023-04-22

## 2023-04-22 RX ORDER — SODIUM CHLORIDE, SODIUM LACTATE, POTASSIUM CHLORIDE, CALCIUM CHLORIDE 600; 310; 30; 20 MG/100ML; MG/100ML; MG/100ML; MG/100ML
INJECTION, SOLUTION INTRAVENOUS CONTINUOUS PRN
Status: DISCONTINUED | OUTPATIENT
Start: 2023-04-22 | End: 2023-04-22

## 2023-04-22 RX ORDER — CEFAZOLIN SODIUM/WATER 3 G/30 ML
3 SYRINGE (ML) INTRAVENOUS SEE ADMIN INSTRUCTIONS
Status: DISCONTINUED | OUTPATIENT
Start: 2023-04-22 | End: 2023-04-22

## 2023-04-22 RX ORDER — HYDRALAZINE HYDROCHLORIDE 20 MG/ML
10 INJECTION INTRAMUSCULAR; INTRAVENOUS
Status: DISCONTINUED | OUTPATIENT
Start: 2023-04-22 | End: 2023-04-24 | Stop reason: HOSPADM

## 2023-04-22 RX ORDER — OXYTOCIN 10 [USP'U]/ML
INJECTION, SOLUTION INTRAMUSCULAR; INTRAVENOUS PRN
Status: DISCONTINUED | OUTPATIENT
Start: 2023-04-22 | End: 2023-04-24 | Stop reason: HOSPADM

## 2023-04-22 RX ORDER — OXYTOCIN/0.9 % SODIUM CHLORIDE 30/500 ML
100-340 PLASTIC BAG, INJECTION (ML) INTRAVENOUS CONTINUOUS PRN
Status: DISCONTINUED | OUTPATIENT
Start: 2023-04-22 | End: 2023-04-22

## 2023-04-22 RX ORDER — MAGNESIUM SULFATE HEPTAHYDRATE 40 MG/ML
4 INJECTION, SOLUTION INTRAVENOUS
Status: DISCONTINUED | OUTPATIENT
Start: 2023-04-22 | End: 2023-04-23

## 2023-04-22 RX ORDER — ONDANSETRON 4 MG/1
4 TABLET, ORALLY DISINTEGRATING ORAL EVERY 30 MIN PRN
Status: DISCONTINUED | OUTPATIENT
Start: 2023-04-22 | End: 2023-04-23

## 2023-04-22 RX ORDER — BUPIVACAINE HYDROCHLORIDE 7.5 MG/ML
INJECTION, SOLUTION INTRASPINAL PRN
Status: DISCONTINUED | OUTPATIENT
Start: 2023-04-22 | End: 2023-04-22

## 2023-04-22 RX ORDER — BECLOMETHASONE DIPROPIONATE HFA 80 UG/1
1 AEROSOL, METERED RESPIRATORY (INHALATION) 2 TIMES DAILY
COMMUNITY

## 2023-04-22 RX ORDER — METOCLOPRAMIDE 5 MG/1
10 TABLET ORAL EVERY 6 HOURS PRN
Status: DISCONTINUED | OUTPATIENT
Start: 2023-04-22 | End: 2023-04-22

## 2023-04-22 RX ORDER — KETOROLAC TROMETHAMINE 30 MG/ML
INJECTION, SOLUTION INTRAMUSCULAR; INTRAVENOUS PRN
Status: DISCONTINUED | OUTPATIENT
Start: 2023-04-22 | End: 2023-04-22

## 2023-04-22 RX ORDER — MORPHINE SULFATE 1 MG/ML
INJECTION, SOLUTION EPIDURAL; INTRATHECAL; INTRAVENOUS PRN
Status: DISCONTINUED | OUTPATIENT
Start: 2023-04-22 | End: 2023-04-22

## 2023-04-22 RX ORDER — ONDANSETRON 2 MG/ML
4 INJECTION INTRAMUSCULAR; INTRAVENOUS EVERY 6 HOURS PRN
Status: DISCONTINUED | OUTPATIENT
Start: 2023-04-22 | End: 2023-04-22

## 2023-04-22 RX ORDER — MAGNESIUM SULFATE HEPTAHYDRATE 40 MG/ML
2 INJECTION, SOLUTION INTRAVENOUS
Status: DISCONTINUED | OUTPATIENT
Start: 2023-04-22 | End: 2023-04-24 | Stop reason: HOSPADM

## 2023-04-22 RX ORDER — HYDRALAZINE HYDROCHLORIDE 20 MG/ML
20 INJECTION INTRAMUSCULAR; INTRAVENOUS ONCE
Status: COMPLETED | OUTPATIENT
Start: 2023-04-22 | End: 2023-04-22

## 2023-04-22 RX ADMIN — Medication 50 MCG/MIN: at 22:12

## 2023-04-22 RX ADMIN — HYDRALAZINE HYDROCHLORIDE 5 MG: 20 INJECTION INTRAMUSCULAR; INTRAVENOUS at 20:14

## 2023-04-22 RX ADMIN — SODIUM CITRATE AND CITRIC ACID MONOHYDRATE 30 ML: 500; 334 SOLUTION ORAL at 21:46

## 2023-04-22 RX ADMIN — HYDRALAZINE HYDROCHLORIDE 20 MG: 20 INJECTION INTRAMUSCULAR; INTRAVENOUS at 21:22

## 2023-04-22 RX ADMIN — MAGNESIUM SULFATE HEPTAHYDRATE 4 G: 40 INJECTION, SOLUTION INTRAVENOUS at 20:52

## 2023-04-22 RX ADMIN — ACETAMINOPHEN 975 MG: 325 TABLET ORAL at 21:46

## 2023-04-22 RX ADMIN — BUPIVACAINE 10 ML: 13.3 INJECTION, SUSPENSION, LIPOSOMAL INFILTRATION at 23:33

## 2023-04-22 RX ADMIN — SODIUM CHLORIDE, POTASSIUM CHLORIDE, SODIUM LACTATE AND CALCIUM CHLORIDE: 600; 310; 30; 20 INJECTION, SOLUTION INTRAVENOUS at 19:41

## 2023-04-22 RX ADMIN — AZITHROMYCIN MONOHYDRATE 500 MG: 500 INJECTION, POWDER, LYOPHILIZED, FOR SOLUTION INTRAVENOUS at 21:33

## 2023-04-22 RX ADMIN — MORPHINE SULFATE 0.1 MG: 1 INJECTION EPIDURAL; INTRATHECAL; INTRAVENOUS at 22:12

## 2023-04-22 RX ADMIN — BUPIVACAINE 10 ML: 13.3 INJECTION, SUSPENSION, LIPOSOMAL INFILTRATION at 23:27

## 2023-04-22 RX ADMIN — FENTANYL CITRATE 10 MCG: 50 INJECTION, SOLUTION INTRAMUSCULAR; INTRAVENOUS at 22:12

## 2023-04-22 RX ADMIN — BUPIVACAINE HYDROCHLORIDE IN DEXTROSE 1.6 ML: 7.5 INJECTION, SOLUTION SUBARACHNOID at 22:12

## 2023-04-22 RX ADMIN — CALCIUM CARBONATE (ANTACID) CHEW TAB 500 MG 500 MG: 500 CHEW TAB at 20:44

## 2023-04-22 RX ADMIN — BUPIVACAINE HYDROCHLORIDE 20 ML: 2.5 INJECTION, SOLUTION EPIDURAL; INFILTRATION; INTRACAUDAL; PERINEURAL at 23:27

## 2023-04-22 RX ADMIN — SODIUM CHLORIDE, POTASSIUM CHLORIDE, SODIUM LACTATE AND CALCIUM CHLORIDE: 600; 310; 30; 20 INJECTION, SOLUTION INTRAVENOUS at 21:02

## 2023-04-22 RX ADMIN — KETOROLAC TROMETHAMINE 30 MG: 30 INJECTION, SOLUTION INTRAMUSCULAR at 23:18

## 2023-04-22 RX ADMIN — PENICILLIN G POTASSIUM 5 MILLION UNITS: 5000000 POWDER, FOR SOLUTION INTRAMUSCULAR; INTRAPLEURAL; INTRATHECAL; INTRAVENOUS at 19:39

## 2023-04-22 RX ADMIN — ONDANSETRON 4 MG: 2 INJECTION INTRAMUSCULAR; INTRAVENOUS at 22:39

## 2023-04-22 RX ADMIN — Medication 340 ML/HR: at 22:39

## 2023-04-22 RX ADMIN — BUPIVACAINE HYDROCHLORIDE 20 ML: 2.5 INJECTION, SOLUTION EPIDURAL; INFILTRATION; INTRACAUDAL; PERINEURAL at 23:33

## 2023-04-22 RX ADMIN — Medication 3 G: at 21:59

## 2023-04-22 RX ADMIN — HYDRALAZINE HYDROCHLORIDE 10 MG: 20 INJECTION INTRAMUSCULAR; INTRAVENOUS at 20:45

## 2023-04-22 RX ADMIN — MAGNESIUM SULFATE HEPTAHYDRATE 2 G/HR: 40 INJECTION, SOLUTION INTRAVENOUS at 21:29

## 2023-04-22 RX ADMIN — PHENYLEPHRINE HYDROCHLORIDE 100 MCG: 10 INJECTION INTRAVENOUS at 22:47

## 2023-04-22 ASSESSMENT — ACTIVITIES OF DAILY LIVING (ADL)
ADLS_ACUITY_SCORE: 18

## 2023-04-22 NOTE — PLAN OF CARE
"S:  Admission  B:  Modesta is a  @ 42w2d gestation, GBS unknown, Hep. B neg, pregnancy uncomplicated. EFW 9#4oz per US few days ago. Had SROM on  at 0020. Has been clear fluid, small amts, but with last vag exam, midwife noted glove full of mec . Has been laboring at home. Has asthma  A:  Patient admitted to room 331 for labor, post dates. Did not see any mec on pad pt was wearing, clear fluid. Ctx irregular, q2-5\", breathing with them. , mod variability, no accels, occas variable. /91  158/95    R: Dr. Henderson aware pt here, will evaluate                           "

## 2023-04-23 LAB
ANION GAP SERPL CALCULATED.3IONS-SCNC: 8 MMOL/L (ref 7–15)
BASOPHILS # BLD AUTO: 0 10E3/UL (ref 0–0.2)
BASOPHILS NFR BLD AUTO: 0 %
BUN SERPL-MCNC: 11.5 MG/DL (ref 6–20)
CALCIUM SERPL-MCNC: 8.2 MG/DL (ref 8.6–10)
CHLORIDE SERPL-SCNC: 101 MMOL/L (ref 98–107)
CREAT SERPL-MCNC: 0.87 MG/DL (ref 0.51–0.95)
DEPRECATED HCO3 PLAS-SCNC: 23 MMOL/L (ref 22–29)
EOSINOPHIL # BLD AUTO: 0.1 10E3/UL (ref 0–0.7)
EOSINOPHIL NFR BLD AUTO: 0 %
ERYTHROCYTE [DISTWIDTH] IN BLOOD BY AUTOMATED COUNT: 13.9 % (ref 10–15)
GFR SERPL CREATININE-BSD FRML MDRD: >90 ML/MIN/1.73M2
GLUCOSE SERPL-MCNC: 87 MG/DL (ref 70–99)
HCT VFR BLD AUTO: 28.1 % (ref 35–47)
HGB BLD-MCNC: 9.2 G/DL (ref 11.7–15.7)
IMM GRANULOCYTES # BLD: 0.1 10E3/UL
IMM GRANULOCYTES NFR BLD: 1 %
LYMPHOCYTES # BLD AUTO: 3.1 10E3/UL (ref 0.8–5.3)
LYMPHOCYTES NFR BLD AUTO: 25 %
MAGNESIUM SERPL-MCNC: 4.9 MG/DL (ref 1.7–2.3)
MCH RBC QN AUTO: 29.5 PG (ref 26.5–33)
MCHC RBC AUTO-ENTMCNC: 32.7 G/DL (ref 31.5–36.5)
MCV RBC AUTO: 90 FL (ref 78–100)
MONOCYTES # BLD AUTO: 0.8 10E3/UL (ref 0–1.3)
MONOCYTES NFR BLD AUTO: 6 %
NEUTROPHILS # BLD AUTO: 8.5 10E3/UL (ref 1.6–8.3)
NEUTROPHILS NFR BLD AUTO: 68 %
NRBC # BLD AUTO: 0 10E3/UL
NRBC BLD AUTO-RTO: 0 /100
PLATELET # BLD AUTO: 167 10E3/UL (ref 150–450)
POTASSIUM SERPL-SCNC: 4.1 MMOL/L (ref 3.4–5.3)
RBC # BLD AUTO: 3.12 10E6/UL (ref 3.8–5.2)
SODIUM SERPL-SCNC: 132 MMOL/L (ref 136–145)
T PALLIDUM AB SER QL: NONREACTIVE
WBC # BLD AUTO: 12.6 10E3/UL (ref 4–11)

## 2023-04-23 PROCEDURE — 36415 COLL VENOUS BLD VENIPUNCTURE: CPT | Performed by: FAMILY MEDICINE

## 2023-04-23 PROCEDURE — 250N000013 HC RX MED GY IP 250 OP 250 PS 637: Performed by: FAMILY MEDICINE

## 2023-04-23 PROCEDURE — 99231 SBSQ HOSP IP/OBS SF/LOW 25: CPT | Performed by: FAMILY MEDICINE

## 2023-04-23 PROCEDURE — 80048 BASIC METABOLIC PNL TOTAL CA: CPT | Performed by: FAMILY MEDICINE

## 2023-04-23 PROCEDURE — 85025 COMPLETE CBC W/AUTO DIFF WBC: CPT | Performed by: FAMILY MEDICINE

## 2023-04-23 PROCEDURE — 250N000009 HC RX 250: Performed by: FAMILY MEDICINE

## 2023-04-23 PROCEDURE — 250N000009 HC RX 250

## 2023-04-23 PROCEDURE — 83735 ASSAY OF MAGNESIUM: CPT | Performed by: FAMILY MEDICINE

## 2023-04-23 PROCEDURE — 258N000003 HC RX IP 258 OP 636: Performed by: FAMILY MEDICINE

## 2023-04-23 PROCEDURE — 250N000011 HC RX IP 250 OP 636: Performed by: FAMILY MEDICINE

## 2023-04-23 PROCEDURE — 250N000013 HC RX MED GY IP 250 OP 250 PS 637

## 2023-04-23 PROCEDURE — 120N000001 HC R&B MED SURG/OB

## 2023-04-23 RX ORDER — IBUPROFEN 800 MG/1
800 TABLET, FILM COATED ORAL EVERY 6 HOURS
Status: DISCONTINUED | OUTPATIENT
Start: 2023-04-24 | End: 2023-04-24 | Stop reason: HOSPADM

## 2023-04-23 RX ORDER — MISOPROSTOL 200 UG/1
400 TABLET ORAL
Status: DISCONTINUED | OUTPATIENT
Start: 2023-04-23 | End: 2023-04-24 | Stop reason: HOSPADM

## 2023-04-23 RX ORDER — CARBOPROST TROMETHAMINE 250 UG/ML
250 INJECTION, SOLUTION INTRAMUSCULAR
Status: DISCONTINUED | OUTPATIENT
Start: 2023-04-23 | End: 2023-04-24 | Stop reason: HOSPADM

## 2023-04-23 RX ORDER — OXYTOCIN/0.9 % SODIUM CHLORIDE 30/500 ML
PLASTIC BAG, INJECTION (ML) INTRAVENOUS
Status: COMPLETED
Start: 2023-04-23 | End: 2023-04-23

## 2023-04-23 RX ORDER — METOCLOPRAMIDE 5 MG/1
10 TABLET ORAL EVERY 6 HOURS PRN
Status: DISCONTINUED | OUTPATIENT
Start: 2023-04-23 | End: 2023-04-24 | Stop reason: HOSPADM

## 2023-04-23 RX ORDER — OXYTOCIN 10 [USP'U]/ML
INJECTION, SOLUTION INTRAMUSCULAR; INTRAVENOUS
Status: DISCONTINUED
Start: 2023-04-23 | End: 2023-04-24 | Stop reason: HOSPADM

## 2023-04-23 RX ORDER — PROCHLORPERAZINE 25 MG
25 SUPPOSITORY, RECTAL RECTAL EVERY 12 HOURS PRN
Status: DISCONTINUED | OUTPATIENT
Start: 2023-04-23 | End: 2023-04-24 | Stop reason: HOSPADM

## 2023-04-23 RX ORDER — METHYLERGONOVINE MALEATE 0.2 MG/ML
200 INJECTION INTRAVENOUS
Status: DISCONTINUED | OUTPATIENT
Start: 2023-04-23 | End: 2023-04-24 | Stop reason: HOSPADM

## 2023-04-23 RX ORDER — DEXTROSE, SODIUM CHLORIDE, SODIUM LACTATE, POTASSIUM CHLORIDE, AND CALCIUM CHLORIDE 5; .6; .31; .03; .02 G/100ML; G/100ML; G/100ML; G/100ML; G/100ML
INJECTION, SOLUTION INTRAVENOUS CONTINUOUS
Status: DISCONTINUED | OUTPATIENT
Start: 2023-04-23 | End: 2023-04-23

## 2023-04-23 RX ORDER — FERROUS SULFATE 325(65) MG
325 TABLET ORAL DAILY
Status: DISCONTINUED | OUTPATIENT
Start: 2023-04-23 | End: 2023-04-24 | Stop reason: HOSPADM

## 2023-04-23 RX ORDER — KETOROLAC TROMETHAMINE 30 MG/ML
30 INJECTION, SOLUTION INTRAMUSCULAR; INTRAVENOUS EVERY 6 HOURS
Status: COMPLETED | OUTPATIENT
Start: 2023-04-23 | End: 2023-04-24

## 2023-04-23 RX ORDER — HYDROCORTISONE 25 MG/G
CREAM TOPICAL 3 TIMES DAILY PRN
Status: DISCONTINUED | OUTPATIENT
Start: 2023-04-23 | End: 2023-04-24 | Stop reason: HOSPADM

## 2023-04-23 RX ORDER — SIMETHICONE 80 MG
80 TABLET,CHEWABLE ORAL 4 TIMES DAILY PRN
Status: DISCONTINUED | OUTPATIENT
Start: 2023-04-23 | End: 2023-04-24 | Stop reason: HOSPADM

## 2023-04-23 RX ORDER — BISACODYL 10 MG
10 SUPPOSITORY, RECTAL RECTAL DAILY PRN
Status: DISCONTINUED | OUTPATIENT
Start: 2023-04-25 | End: 2023-04-24 | Stop reason: HOSPADM

## 2023-04-23 RX ORDER — PROCHLORPERAZINE MALEATE 5 MG
10 TABLET ORAL EVERY 6 HOURS PRN
Status: DISCONTINUED | OUTPATIENT
Start: 2023-04-23 | End: 2023-04-24 | Stop reason: HOSPADM

## 2023-04-23 RX ORDER — ONDANSETRON 4 MG/1
4 TABLET, ORALLY DISINTEGRATING ORAL EVERY 6 HOURS PRN
Status: DISCONTINUED | OUTPATIENT
Start: 2023-04-23 | End: 2023-04-24 | Stop reason: HOSPADM

## 2023-04-23 RX ORDER — METOCLOPRAMIDE HYDROCHLORIDE 5 MG/ML
10 INJECTION INTRAMUSCULAR; INTRAVENOUS EVERY 6 HOURS PRN
Status: DISCONTINUED | OUTPATIENT
Start: 2023-04-23 | End: 2023-04-24 | Stop reason: HOSPADM

## 2023-04-23 RX ORDER — TRANEXAMIC ACID 10 MG/ML
1 INJECTION, SOLUTION INTRAVENOUS EVERY 30 MIN PRN
Status: DISCONTINUED | OUTPATIENT
Start: 2023-04-23 | End: 2023-04-24 | Stop reason: HOSPADM

## 2023-04-23 RX ORDER — LIDOCAINE 40 MG/G
CREAM TOPICAL
Status: DISCONTINUED | OUTPATIENT
Start: 2023-04-23 | End: 2023-04-24 | Stop reason: HOSPADM

## 2023-04-23 RX ORDER — ACETAMINOPHEN 325 MG/1
975 TABLET ORAL EVERY 6 HOURS
Status: DISCONTINUED | OUTPATIENT
Start: 2023-04-23 | End: 2023-04-24 | Stop reason: HOSPADM

## 2023-04-23 RX ORDER — OXYTOCIN/0.9 % SODIUM CHLORIDE 30/500 ML
340 PLASTIC BAG, INJECTION (ML) INTRAVENOUS CONTINUOUS PRN
Status: DISCONTINUED | OUTPATIENT
Start: 2023-04-23 | End: 2023-04-24 | Stop reason: HOSPADM

## 2023-04-23 RX ORDER — SODIUM CHLORIDE, SODIUM LACTATE, POTASSIUM CHLORIDE, CALCIUM CHLORIDE 600; 310; 30; 20 MG/100ML; MG/100ML; MG/100ML; MG/100ML
INJECTION, SOLUTION INTRAVENOUS CONTINUOUS
Status: DISCONTINUED | OUTPATIENT
Start: 2023-04-23 | End: 2023-04-24 | Stop reason: HOSPADM

## 2023-04-23 RX ORDER — OXYTOCIN 10 [USP'U]/ML
10 INJECTION, SOLUTION INTRAMUSCULAR; INTRAVENOUS
Status: DISCONTINUED | OUTPATIENT
Start: 2023-04-23 | End: 2023-04-24 | Stop reason: HOSPADM

## 2023-04-23 RX ORDER — TRANEXAMIC ACID 10 MG/ML
1 INJECTION, SOLUTION INTRAVENOUS ONCE
Status: COMPLETED | OUTPATIENT
Start: 2023-04-23 | End: 2023-04-23

## 2023-04-23 RX ORDER — AMOXICILLIN 250 MG
2 CAPSULE ORAL 2 TIMES DAILY
Status: DISCONTINUED | OUTPATIENT
Start: 2023-04-23 | End: 2023-04-24 | Stop reason: HOSPADM

## 2023-04-23 RX ORDER — OXYCODONE HYDROCHLORIDE 5 MG/1
5 TABLET ORAL EVERY 4 HOURS PRN
Status: DISCONTINUED | OUTPATIENT
Start: 2023-04-23 | End: 2023-04-24 | Stop reason: HOSPADM

## 2023-04-23 RX ORDER — ONDANSETRON 2 MG/ML
4 INJECTION INTRAMUSCULAR; INTRAVENOUS EVERY 6 HOURS PRN
Status: DISCONTINUED | OUTPATIENT
Start: 2023-04-23 | End: 2023-04-24 | Stop reason: HOSPADM

## 2023-04-23 RX ORDER — AMOXICILLIN 250 MG
1 CAPSULE ORAL 2 TIMES DAILY
Status: DISCONTINUED | OUTPATIENT
Start: 2023-04-23 | End: 2023-04-24 | Stop reason: HOSPADM

## 2023-04-23 RX ORDER — MODIFIED LANOLIN
OINTMENT (GRAM) TOPICAL
Status: DISCONTINUED | OUTPATIENT
Start: 2023-04-23 | End: 2023-04-24 | Stop reason: HOSPADM

## 2023-04-23 RX ADMIN — MISOPROSTOL 800 MCG: 200 TABLET ORAL at 00:28

## 2023-04-23 RX ADMIN — MAGNESIUM SULFATE HEPTAHYDRATE 2 G/HR: 40 INJECTION, SOLUTION INTRAVENOUS at 15:36

## 2023-04-23 RX ADMIN — MISOPROSTOL 800 MCG: 100 TABLET ORAL at 00:40

## 2023-04-23 RX ADMIN — SENNOSIDES AND DOCUSATE SODIUM 1 TABLET: 8.6; 5 TABLET ORAL at 09:31

## 2023-04-23 RX ADMIN — TRANEXAMIC ACID 1 G: 10 INJECTION, SOLUTION INTRAVENOUS at 01:09

## 2023-04-23 RX ADMIN — FERROUS SULFATE TAB 325 MG (65 MG ELEMENTAL FE) 325 MG: 325 (65 FE) TAB at 18:30

## 2023-04-23 RX ADMIN — KETOROLAC TROMETHAMINE 30 MG: 30 INJECTION, SOLUTION INTRAMUSCULAR at 18:31

## 2023-04-23 RX ADMIN — SODIUM CHLORIDE, POTASSIUM CHLORIDE, SODIUM LACTATE AND CALCIUM CHLORIDE: 600; 310; 30; 20 INJECTION, SOLUTION INTRAVENOUS at 08:28

## 2023-04-23 RX ADMIN — MAGNESIUM SULFATE HEPTAHYDRATE 2 G/HR: 40 INJECTION, SOLUTION INTRAVENOUS at 06:34

## 2023-04-23 RX ADMIN — KETOROLAC TROMETHAMINE 30 MG: 30 INJECTION, SOLUTION INTRAMUSCULAR at 12:21

## 2023-04-23 RX ADMIN — ACETAMINOPHEN 975 MG: 325 TABLET ORAL at 18:31

## 2023-04-23 RX ADMIN — ACETAMINOPHEN 975 MG: 325 TABLET ORAL at 12:21

## 2023-04-23 RX ADMIN — SENNOSIDES AND DOCUSATE SODIUM 2 TABLET: 8.6; 5 TABLET ORAL at 22:10

## 2023-04-23 RX ADMIN — Medication 30 UNITS: at 01:10

## 2023-04-23 RX ADMIN — ACETAMINOPHEN 975 MG: 325 TABLET ORAL at 06:33

## 2023-04-23 ASSESSMENT — ACTIVITIES OF DAILY LIVING (ADL)
ADLS_ACUITY_SCORE: 18

## 2023-04-23 NOTE — ANESTHESIA PROCEDURE NOTES
"Intrathecal Procedure Note    Pre-Procedure   Staff -        CRNA: Lina Siddiqi APRN CRNA       Performed By: CRNA       Location: OR       Procedure Start/Stop Times: 2023 10:04 PM and 2023 10:12 PM       Pre-Anesthestic Checklist: patient identified, IV checked, risks and benefits discussed, informed consent, monitors and equipment checked and pre-op evaluation  Timeout:       Correct Patient: Yes        Correct Procedure: Yes        Correct Site: Yes        Correct Position: Yes   Procedure Documentation  Procedure: intrathecal       Diagnosis:        Patient Position: sitting       Patient Prep/Sterile Barriers: sterile gloves, mask, patient draped       Skin prep: Betadine       Insertion Site: L3-4. (midline approach).       Needle Gauge: 25.        Needle Length (Inches): 5        Spinal Needle Type: Lupe tip       Introducer used       Introducer: 20 G       # of attempts: 1 and  # of redirects:  2    Assessment/Narrative         Paresthesias: Yes and Resolved.       CSF fluid: clear.       Opening pressure was cmH2O while  Sitting.      Medication(s) Administered   Medication Administration Time: 2023 10:04 PM     Comments:  Patient tolerated procedure well. Patient stated she felt sharp shooting pain about 0.5cm superficial to spinal space, but stated that it resolved quickly. No other complications noted.      FOR [facility] ([facility]) ONLY:   Pain Team Contact information: please page the Pain Team Via [employer]. Search \"Pain\". During daytime hours, please page the attending first. At night please page the resident first.      "

## 2023-04-23 NOTE — PLAN OF CARE
here and assessed pt, US done. Discussed options and made plan with pt. She is dilated 2/80/-4. Fluid on pad is greenish/gray.   Breathing with ctx and does want to try nitrous. Is ok with starting antibiotics    here and supportive, also midwife here.   BP 1900 is 127/93  Report to Fabien at 1905

## 2023-04-23 NOTE — ANESTHESIA POSTPROCEDURE EVALUATION
Patient: Modesta Bill    Procedure: Procedure(s):   SECTION       Anesthesia Type:  CSE    Note:  Disposition: Inpatient   Postop Pain Control: Uneventful            Sign Out: Well controlled pain   PONV: No   Neuro/Psych: Uneventful            Sign Out: Acceptable/Baseline neuro status   Airway/Respiratory: Uneventful            Sign Out: Acceptable/Baseline resp. status   CV/Hemodynamics: Uneventful            Sign Out: Acceptable CV status   Other NRE: NONE   DID A NON-ROUTINE EVENT OCCUR? No    Event details/Postop Comments:  Pt was happy with her anesthesia care.  No complications.  I advised the pt she may have some soreness at the spinal insertion site and this is normal.  However if the soreness continues over a week or if redness is noted around the site to let anesthesia know.  She is also reporting minimal incision pain with movement, and we discussed IV/oral analgesics as needed over the next few days as the TAP block wears off.  I will follow up with the pt if needed.           Last vitals:  Vitals Value Taken Time   /87 23 0130   Temp 98.2  F (36.8  C) 23 0045   Pulse 88 23 0130   Resp 16 23 0130   SpO2 99 % 23 0116   Vitals shown include unvalidated device data.    Electronically Signed By: SHALINI Dobbs CRNA  2023  12:43 PM

## 2023-04-23 NOTE — ANESTHESIA PREPROCEDURE EVALUATION
Anesthesia Pre-Procedure Evaluation    Patient: Modesta Bill   MRN: [id number] : 1995        Procedure : Procedure(s):   SECTION          Past Medical History:   Diagnosis Date     Asthma      Uncomplicated asthma       Past Surgical History:   Procedure Laterality Date     ENT SURGERY       MYRINGOTOMY        Allergies   Allergen Reactions     Hydrocodone-Acetaminophen Dizziness      Social History     Tobacco Use     Smoking status: Former     Types: Cigarettes     Smokeless tobacco: Former   Vaping Use     Vaping status: Not on file   Substance Use Topics     Alcohol use: Not Currently      Wt Readings from Last 1 Encounters:   23 137 kg (302 lb)        Anesthesia Evaluation   Pt has had prior anesthetic. Type: General.    No history of anesthetic complications       ROS/MED HX  ENT/Pulmonary:     (+) asthma     Neurologic:  - neg neurologic ROS     Cardiovascular:     (+) --PIH and BP Meds and Mg ++ gtt ---    METS/Exercise Tolerance: >4 METS    Hematologic:  - neg hematologic  ROS     Musculoskeletal:  - neg musculoskeletal ROS     GI/Hepatic:  - neg GI/hepatic ROS     Renal/Genitourinary:  - neg Renal ROS     Endo:     (+) Obesity,     Psychiatric/Substance Use:  - neg psychiatric ROS     Infectious Disease:  - neg infectious disease ROS     Malignancy:  - neg malignancy ROS     Other:  - neg other ROS          Physical Exam    Airway        Mallampati: II   TM distance: > 3 FB   Neck ROM: full   Mouth opening: > 3 cm    Respiratory Devices and Support         Dental  no notable dental history         Cardiovascular   cardiovascular exam normal          Pulmonary   pulmonary exam normal                OUTSIDE LABS:  CBC:   Lab Results   Component Value Date    WBC 14.7 (H) 2023    WBC 6.5 2022    HGB 12.0 2023    HGB 13.0 2022    HCT 36.0 2023    HCT 38.2 2022     2023     2022     BMP:   Lab Results   Component Value  Date     04/22/2023     08/25/2022    POTASSIUM 3.9 04/22/2023    POTASSIUM 4.1 08/25/2022    CHLORIDE 104 04/22/2023    CHLORIDE 109 08/25/2022    CO2 19 (L) 04/22/2023    CO2 22 08/25/2022    BUN 10.9 04/22/2023    BUN 8 08/25/2022    CR 0.80 04/22/2023    CR 0.52 08/25/2022    GLC 72 04/22/2023    GLC 89 08/25/2022     COAGS: No results found for: PTT, INR, FIBR  POC: No results found for: BGM, HCG, HCGS  HEPATIC:   Lab Results   Component Value Date    ALBUMIN 3.3 (L) 04/22/2023    PROTTOTAL 6.9 04/22/2023    ALT 14 04/22/2023    AST 22 04/22/2023    ALKPHOS 305 (H) 04/22/2023    BILITOTAL 0.2 04/22/2023     OTHER:   Lab Results   Component Value Date    ANNELIES 9.5 04/22/2023    MAG 1.9 04/22/2023       Anesthesia Plan    ASA Status:  3      Anesthesia Type: CSE.              Consents    Anesthesia Plan(s) and associated risks, benefits, and realistic alternatives discussed. Questions answered and patient/representative(s) expressed understanding.    - Discussed:     - Discussed with:  Patient         Postoperative Care            Comments:    Other Comments: The risks and benefits of anesthesia, and the alternatives where applicable, have been discussed with the patient, and they wish to proceed.    Plan for transversus abdominis plane nerve block for postoperative pain management. All risks and benefits were discussed, and patient verbalized understanding and wishes to proceed with nerve block. Paper consent obtained.            SHALINI Dobbs CRNA

## 2023-04-23 NOTE — INTERVAL H&P NOTE
I have reviewed the surgical (or preoperative) H&P that is linked to this encounter, and examined the patient. There are no significant changes    Clinical Conditions Present on Arrival:  Clinically Significant Risk Factors Present on Admission         # Hyponatremia: Lowest Na = 134 mmol/L in last 30 days, will monitor as appropriate      # Hypoalbuminemia: Lowest albumin = 3.2 g/dL at 4/22/2023  9:28 PM, will monitor as appropriate          
DISCHARGE

## 2023-04-23 NOTE — ANESTHESIA PROCEDURE NOTES
TAP Procedure Note    Pre-Procedure   Staff -        CRNA: Lina Siddiqi APRN CRNA       Performed By: CRNA       Location: OR       Pre-Anesthestic Checklist: patient identified, IV checked, site marked, risks and benefits discussed, informed consent, monitors and equipment checked, pre-op evaluation, at physician/surgeon's request and post-op pain management  Timeout:       Correct Patient: Yes        Correct Procedure: Yes        Correct Site: Yes        Correct Position: Yes        Correct Laterality: Yes        Site Marked: Yes  Procedure Documentation  Procedure: TAP       Diagnosis:        Laterality: bilateral       Patient Position: supine       Patient Prep/Sterile Barriers: sterile gloves, mask       Skin prep: Chloraprep       Needle Type: insulated       Needle Gauge: 21.        Needle Length (millimeters): 100        Ultrasound guided       1. Ultrasound was used to identify targeted nerve, plexus, vascular marker, or fascial plane and place a needle adjacent to it in real-time.       2. Ultrasound was used to visualize the spread of anesthetic in close proximity to the above referenced structure.       3. A permanent image is entered into the patient's record.    Assessment/Narrative         The placement was negative for: blood aspirated, painful injection and site bleeding       Paresthesias: No.       Test dose of mL at.         Test dose negative, 3 minutes after injection, for signs of intravascular, subdural, or intrathecal injection.       Bolus given via needle..        Secured via.        Insertion/Infusion Method: Single Shot       Complications: none       Injection made incrementally with aspirations every 5 mL.     Comments:  Pt tolerated the procedure well as under spinal Anesthesia.  Procedure challenging to do body habitus, but there was fair visualization of the space between the internal oblique and the transverses abdominis and of the fluid dissection in this layer.  " No complications were noted.  I will follow up with this pt if needed.      FOR [facility] ([facility]) ONLY:   Pain Team Contact information: please page the Pain Team Via [employer]. Search \"Pain\". During daytime hours, please page the attending first. At night please page the resident first.      "

## 2023-04-23 NOTE — PROGRESS NOTES
Patient and her  decided to go for  which I think is an appropriate decision.  Meconium is quite thick.  BP is getting higher.      Labs reviewed.    Urgent  scar was called.  Will have Dr. Parra and Dr. Acuna to be in OR room to assist    Discussed with her about the procedure in details and its recovery.  Potential complications also discussed.  She is requested to get the CS done as soon as possible.    Beck Henderson MD.

## 2023-04-23 NOTE — PROGRESS NOTES
Pt has BP readings that qualify for HTN algorithm and magnesium. At this time patient accepts hydralazine but needs time to talk with  about starting magnesium. Ashley Patel RN on 4/22/2023 at 8:24 PM

## 2023-04-23 NOTE — PLAN OF CARE
Goal Outcome Evaluation:    Shift note     16 hours postpartum with hypertension    VSS, no recent elevated blood pressures. Magnesium sulfate IV infusing at 2gm/hour. FF with scant to light rubra lochia. Pain well managed with tap block, toradol, tylenol and ice. Interdry in panus, incision intact and dry. She denies any s/s of preeclampsia or toxicity. Labs earlier: hemoglobin 9.2 others stable. Eating and taking fluids well, BS positive x4, passing little to no gas. Denies nausea. Bonding appropriate with baby. Some assistance needed with latching and nursing. Sat at bedside x 15 minutes this late afternoon then stood at bedside, tolerated well.    Report given to receiving RN, Ashley FREGOSO Plan to follow up with Dr. Henderson at 2230 prior to Magnesium sulfate discontinuation.

## 2023-04-23 NOTE — PROGRESS NOTES
[facility] Obstetrics Post-Op / Progress Note         Assessment and Plan:    Assessment:   Post-operative day #1  Low transverse primary  section  L&D complications: Pre-eclampsia affecting pregnancy, antepartum [O14.90]  Prolonged rupture of membranes [O42.90]  Post term pregnancy, antepartum condition or complication [O48.0]  Meconium staining [P96.83]  Morbid obesity (H) [E66.01]  Anemia secondary to acute blood loss      Doing well.  BP has been higher.  Clean wound without signs of infection.  No immediate surgical complications identified.  Increased vaginal bleeding after surgery now decreased to normal flow         - s/p TXA, cytotec, 2 bags pitocin      Plan:   Ambulation encouraged  Breast feeding strategies discussed  Hemoglobin in AM  Lactation consultation  Monitor wound for signs of infection  Pain control measures as needed  Reportable signs and symptoms dicussed with the patient  Start iron supplementation  Discontinue magnesium at 24 hours if BP stable  Discontinue louis when appropriate  Anticipate discharge in 1-2 days  Consider oral antihypertensive medication if blood pressure persistently >140/90.  Continue to monitor for magnesium toxicity.  Discussed about potential complication/side effect from the magnesium infusion.  Continue to monitor for kidney function and strict I/O while on the magnesium infusion           Interval History:   Doing well.  Pain is well-controlled.  No fever.  No history of wound drainage, warmth or significant erythema.  Good appetite.  Denies chest pain, shortness of breath, nausea or vomiting. No headaches or RUQ pain.  Not ambulatory yet, feeling very tired. Calf compression devices in place.  Blood pressure has been stable around 140s/80s.  Tolerating the mag infusion well.  Louis in place. No flatus. Breastfeeding well.  No concern from nursing staff.  The bleeding has stopped after the second bag of Pitocin.  Good urine output.           Significant Problems:    Active Problems:    Asthma    Morbid obesity (H)    Post term pregnancy, antepartum condition or complication    Prolonged rupture of membranes    Meconium staining    Pre-eclampsia affecting pregnancy, antepartum            Review of Systems:    The patient denies any chest pain, shortness of breath, excessive pain, fever, chills, purulent drainage from the wound, nausea or vomiting. No headaches or RUQ pain.          Medications:     All medications related to the patient's surgery have been reviewed    acetaminophen  975 mg Oral Q6H     fluticasone  1 puff Inhalation Daily     [START ON 2023] ibuprofen  800 mg Oral Q6H     ketorolac  30 mg Intravenous Q6H     Measles, Mumps & Rubella Vac  0.5 mL Subcutaneous Once     senna-docusate  1 tablet Oral BID    Or     senna-docusate  2 tablet Oral BID     sodium chloride (PF)  3 mL Intracatheter Q8H     Tdap (tetanus-diphtheria-acell pertussis)  0.5 mL Intramuscular Once       lactated ringers 25 mL/hr at 23 0922     magnesium sulfate 2 g/hr (23 0634)     oxytocin in 0.9% NaCl               Physical Exam:     Patient Vitals for the past 8 hrs:   BP Temp Temp src Pulse Resp SpO2   23 1100 (!) 141/68 -- -- 86 18 --   23 1000 135/68 -- -- 95 18 --   23 0930 (!) 150/70 -- -- 70 -- --   23 0900 139/84 -- -- 81 -- --   23 0830 134/75 -- -- 80 -- --   23 0730 (!) 142/80 97.8  F (36.6  C) Oral 81 18 98 %   23 0642 (!) 145/86 -- -- 86 20 98 %   23 0529 (!) 140/84 -- -- 91 20 98 %     Blood pressure range: Systolic (24hrs), Av , Min:125 , Max:184   Blood pressure range: Diastolic (24hrs), Av, Min:68, Max:101    Alert, pleasant, comfortable in bed, no acute distress    Wound clean and dry with minimal or no drainage.  Surrounding skin with minimal erythema.  Uterine fundus firm, midline, at umbilicus.  Lungs are clear, no wheezes or crackle.  Good respiratory effort throughout.  Heart  is regular rate and rhythm.  Abdomen: soft, positive bowel sound.  Legs: Trace edema bilaterally below the knees.  Neuro: No focal neurological deficit.  DTR +2 throughout.  No clonus or hyperreflexia.            Data:   All laboratory data related to this surgery reviewed       Lab Results   Component Value Date     04/23/2023     04/22/2023     04/22/2023    Lab Results   Component Value Date    CHLORIDE 101 04/23/2023    CHLORIDE 102 04/22/2023    CHLORIDE 104 04/22/2023          Lab Results   Component Value Date    BUN 11.5 04/23/2023    BUN 10.3 04/22/2023    BUN 10.9 04/22/2023            Lab Results   Component Value Date    POTASSIUM 4.1 04/23/2023    POTASSIUM 4.0 04/22/2023    POTASSIUM 3.9 04/22/2023          Lab Results   Component Value Date    CO2 23 04/23/2023    CO2 18 04/22/2023    CO2 19 04/22/2023          Lab Results   Component Value Date    CR 0.87 04/23/2023    CR 0.80 04/22/2023    CR 0.80 04/22/2023        Lab Results   Component Value Date    WBC 12.6 (H) 04/23/2023    WBC 14.7 (H) 04/22/2023          HGB 9.2 (L) 04/23/2023    HGB 12.0 04/22/2023          HCT 28.1 (L) 04/23/2023    HCT 36.0 04/22/2023          MCV 90 04/23/2023    MCV 89 04/22/2023           04/23/2023     04/22/2023           Lab Results   Component Value Date    HGB 9.2 (L) 04/23/2023    HGB 12.0 04/22/2023    HGB 13.0 08/25/2022     No imaging studies have been ordered    Rosario Goodwin MS3  [employer]    I was present with the medical student who participated in the service and in the documentation of the note. I have verified the history and personally performed the physical exam and medical decision making. I agree with the assessment and plan of care as documented in the note.    Beck Henderson MD.

## 2023-04-23 NOTE — PROGRESS NOTES
S: Transfer to postpartum  B: Primary @ 2237, breast feeding    A: Mother and baby transferred to postpartum unit at 0200 via bed after completion of immediate recovery period. Recovery complicated by PPH requiring additional medications. Patient oriented to room. Mother and baby bonding well and in satisfactory condition upon transfer.  R: Anticipate postpartum care with Magnesium Sulfate infusing.

## 2023-04-23 NOTE — OR NURSING
Transfer from  pacu to Room post partum ph-3  Transferred via own bed    S: 28 y/o f  S/P        Anesthesia Type:  spinal       Surgeon:  Dr. Henderson       Allergies:  See Medication Reconciliation Record       DNR: no      B:  Pertinent Medical History:  1st delivery >42 weeks failure to progress  Past Medical History:   Diagnosis Date     Asthma      Uncomplicated asthma              Surgical History:    Past Surgical History:   Procedure Laterality Date     ENT SURGERY       MYRINGOTOMY         A:  EBL:1020 ml see ob nurse record for vaginal bleeding totals        IVF:  1000 LR in OR, pitocin 300 ml in OR, magnesium continues as pre-op at 50 ml per hours                 Additional 200 of LR pitocin increased to 340 x 1 hour then decreased to 100 magnesium continues at the 2mg/hr or 50 cc        UOP: 150 ml emptied from louis in OR additional approx 100 ml         NPO:  ___Yes _X__No         Vomiting:  ___Yes _X__No         Drainage: vaginal drainage moderate with pad changed w in 1st 5 minutes of pacu with additional x3 pad changes        Skin Integrity: incision clean and dry        RFO: _X__Yes___No (urinary catheter)        Brace/sling/equipment:  X___Yes___No (pneumatic compression sleeves)       Pain:  Long acting Duramorph and Tap block given       CMS:  Able to move legs spontaneous       See PACU record for ongoing assessment, vital signs and pain assessment.       Report given to inessa Ramirez RN on post partum   R: Post-Op vitals and assessments as ordered/indicated per patient's condition.       Follow Post-Op orders and notify Physician prn.       Continue to involve patient/family in plan of care and discharge planning.       Reinforce Pre-Operative education.       Implement skin safety interventions as appropriate.    Name: Sam DEVINE

## 2023-04-23 NOTE — L&D DELIVERY NOTE
Delivery Summary    Modesta Bill MRN# [id number]   Age: 27 year old YOB: 1995       Procedure: Primary low transverse  section    Indication: Failure to progress, macrosomia, prolonged SROM, thick meconium    Surgeon:  Beck Henderson MD.   Assistant Surgeon: Dr. Acuna, Dr. Parra, Rosario Goodwin (MS)     EBL:  1000 ml.    Antibiotic prior to surgery: Ancef and Zithromax    Findings:    Healthy looking boy with vertex/OP presentation - apgar score of 7 and 9 at 1 and 5 minutes   Very thick meconium    Dark blood clots consistent with abruption    Macrosomia       Modesta is a 27 year old female, with  at 42w4d weeks gestation who was admitted for prolonged SROM, thick meconium, failure to progress, and very concern of macrosomia    The whole procedure was then discussed with the patient and her  in details.  Risks associated with the procedure which included but were not limited to bleeding, infection, bowel injury or anesthesia complications, were discussed.  In rare case, heavy bleeding may require blood transfusion or hysterectomy.   The patient was okay to proceed with the procedure and the consent was signed.    Very high risk surgery due to morbid obesity.  Also concern of  health and therefore I asked Dr. acuna to assist in the  Section because of the special skill set he possess in Myotomy repair, fascial closure, and special skin closure techniques.  Dr. Acuna was a vital assistant in all of the above listed procedures. His skills allowed us to shorten the length of the procedure considerably, which has been shown to decrease risk of infection, decrease post operative morbidity, and improve outcomes.  I also asked Dr. Parra be present for  care.      DESCRIPTION OF THE PROCEDURE:    The patient was given a dose of Ancelf and Zithromax before the procedure.  She was then taken to the operating room.  Kinsey catheter was placed after she had  "a spinal.  She was given spinal anesthesia with good effect by CRNA.  She was placed in the supine position.  The abdomen was prepped and draped in the usual sterile manner.      A low transverse abdominal incision was made, about 10 cm in length.  The incision was taken down sharply to the fascia.  The fascia was incised and extended bilaterally.  The rectus abdominus was taken down from the fascia with fingers side to side.  The rectus abdominus was divided in the midline and extended vertically bilaterally.  The peritoneum was then carefully entered and extended vertically by fingers.  Evelyne was placed in in the usual manner. The uterus was identified.  Bladder flap was created and taken down sharply with fingers.  A low transverse uterine incision was made at the midline and extended bilaterally with fingers.  There was a 150 ml amount of thick greenish \"pea soup\" consistent amniotic fluid noted.      The baby was in the OP position.  The head was delivered first followed  by the delivery of the body in a usual manner with no difficulty.  Mouth was gently cleaned with gauss and bulb suctioning.  The cord was then clamped and cut immediately.  The infant was passed to the warmer with nursing attending.  Apgar score of 7 and 9 at 1 and 5 minutes respectively.    The placenta was delivered spontaneously.  The placenta was examined, and it looked normal with a 3-vessel cord noted.  There were no abnormalities or excessive calcifications on the placenta.  The uterus was then massaged aggressively.  1 ml of Pitocin was injected directly into the fundus.  30 units of Pitocin was infused at the bolus rate.  The uterus was then grasped and cleaned with wet laps.  The patient had good hemostasis.  The uterine incision was then grasped with Bell and then closed with locking suture using O Vycryl.  The second umbilicated uterine closure performed with PDS suture.  The patient had good hemostasis from that as well.    The " gutter was then cleaned of blood and clots.  Examination of the fallopian tubes, fimbria and ovaries was normal.      The uterine incision was then irrigated and examined carefully again, which showed good hemostasis. The peritoneum fascia was closed with 3.0 Vicryl. The fascia was then closed with running suture of O-Vicryl in the usual manner.  The subcutaneous tissue was then irrigated and proximate with 3 interrupted suture, using thing 2.0 chromic.  The skin was then closed with Quilt suture.    Modesta tolerated the procedure well.  Sponge, needle, and instrument counts were correct.  Estimated blood loss was about 1000 cc.  She was taken to the recovery room in good condition after the nerve block.  Routine post partum care was initiated.  She and her  were updated about the procedure.  All of their questions were answered.    Beck Henderson MD.        Carolina Bill [[id number]]    Labor Event Times    Latent labor onset date/time: 2023    Decision date/time (emergent ): 2023      Labor Length    3rd Stage (hrs): 0 (min): 2      Labor Events     labor?: No   steroids: None  Labor Type: Augmentation  Predominate monitoring during 1st stage: continuous electronic fetal monitoring     Antibiotics received during labor?: Yes  Reason for Antibiotics: GBS  Antibiotics received for GBS: Penicillin  Antibiotics Given (GBS): Less than or equal to 4 hours prior to delivery     Rupture date/time: 230   Rupture type: Spontaneous Rupture of Membranes  Fluid color: Meconium     Augmentation: Oxytocin  Indications for augmentation: Hypertension, Ineffective Contraction Pattern, Prolonged ROM, Preeclampsia     Delivery/Placenta Date and Time    Delivery Date: 23 Delivery Time: 10:37 PM   Placenta Date/Time: 2023 10:40 PM  Delivering clinician: Beck Henderson MD   Other personnel present at delivery:  Provider Role   Az Acuna MD Family  "Medicine Physician   Vern Parra MD Family Medicine Physician   Rosario Goodwin Medical Student   Ashley Patel, RN Registered Nurse   Tierney Lozada RN Registered Nurse         Apgars    Living status: Living   1 Minute 5 Minute 10 Minute 15 Minute 20 Minute   Skin color: 1  1       Heart rate: 1  2       Reflex irritability: 2  2       Muscle tone: 2  2       Respiratory effort: 1  2       Total: 7  9          Cord    Vessels: 3 Vessels    Cord Complications: None   Cord Blood Disposition: Lab      Gases Sent?: No      Delayed cord clamping?: Yes      Cord Clamping Delay (seconds): 31-60 seconds      Stem cell collection?: No                     [place] Resuscitation    Methods: Suctioning, Javier Puff  [place] Care at Delivery: 1039 HR 80's  1039 PPV started, for less than 1 minute  1040 CPAP 's  1044 RR 66   1046 Nasal flaring blow by   Suctioning performed x4 nasally x1, and orally x3 (orally first)  Output in Delivery Room: Stool     [place] Measurements    Weight: 9 lb 9.6 oz Length: 1' 9\"   Head circumference: 36.8 cm Chest circumference: 36.8 cm   Output in delivery room: Stool     Skin to Skin and Feeding Plan    Skin to skin initiation date/time: 1841    Skin to skin with: Mother  Skin to skin end date/time:        Labor Events and Shoulder Dystocia    Fetal Tracing Prior to Delivery: Category 2     Delivery (Maternal) (Provider to Complete) (450563)    Episiotomy: None  Perineal lacerations: None    Repair suture: None  Genital tract inspection done: Neg  Comment if genital tract inspection not done: CS     Blood Loss  Mother: Modesta Bill #[id number]   Start of Mother's Information    Delivery Blood Loss  23 2230 - 23 2338    Total Surgical QBL Blood Loss (mL) Hospital Encounter 1025 mL    Total  1025 mL         End of Mother's Information  Mother: Modesta Bill #[id number]          Delivery - Provider to Complete (706960)    Delivering " clinician: Beck Henderson MD  Delivery Type (Choose the 1 that will go to the Birth History): , Low Transverse     Priority: Urgent    Specifics: Primary nulliparous   Indications for : Arrest of dilation, Maternal indication   Other personnel:  Provider Role   Az Acuna MD Family Medicine Physician   Vern Parra MD Family Medicine Physician   [place], [place] Medical Student   Ashley Patel RN Registered Nurse   Tierney Lozada RN Registered Nurse                               Placenta    Date/Time: 2023 10:40 PM  Removal: Spontaneous  Disposition: Patient possesion           Anesthesia    Method: Spinal                Presentation and Position    Presentation: Vertex     Occiput Posterior                 Beck Pena Mai, MD

## 2023-04-23 NOTE — PROGRESS NOTES
S:  Section Delivery  B: Primary  Section for SROM >24 hrs, mothers request  A: Baby delivered, cord clamping delayed for 60-90 seconds, then brought to pre- warmed infant warmer. Infant stimulated and dried, resuscitation measures performed. Infant then brought to mother and placed skin to skin for bonding. Apgars 7/9. Educated mother on expected feeding readiness cues and encouraged her to observe for feeding cues. Mother informed that breast feeding assistance would be provided.   R: Mother and baby bonding well. Anticipate first feed within the hour.    Resuscitation Measures:   1039 HR 80's   1039 PPV started, for less than 1 minute   1040 CPAP 's   1044 RR 66    1046 Nasal flaring blow by   Suctioning performed x4 nasally x1, and orally x3 (orally first)

## 2023-04-23 NOTE — PROGRESS NOTES
VORB/TORB for nitrous at 1900 by Dr. Henderson, pt signed consent. Kary Mendez RN on 4/22/2023 at 9:13 PM

## 2023-04-23 NOTE — H&P
[facility] Labor and Delivery History and Physical    Modesta Bill MRN# [id number]   Age: 27 year old YOB: 1995     Date of Admission:  2023    Primary care provider: Buddy Diop           Chief Complaint:   Modesta Bill is a 27 year old female with  at 42w4d who was admitted for prolonged SROM with meconium staining and postdates.  Attempted home delivery with midwife.  Been ruptured spontaneously for 42 hours.  Contractions started about 24 hours ago, getting more intense but still irregular, about every 3-7 minutes.  The midwife noted meconium stained about 6 hours ago and then again couple hours ago and therefore she was recommended to be transferred here for further management.  Contractions are painful, but not feeling much pelvic pressure with the contractions.  Still leaking but no bleeding.  Normal fetal movement.  No headache, dizziness, acute change in her vision.  No upper right quadrant pain.  No fever or chills.  She is morbidly obese.  No history of high blood pressure.    Reviewed the medical record from the midwife extensively.  Good prenatal care.  The pregnancy was complicated with morbid obesity.  JEANETTE was based on LMP which is consistent with ultrasound at 8 week gestation.  This is a planned pregnancy.  Prenatal lab blood type O+, the rest were normal except that she is not immune to rubella. Hgb A1c was 5.4.  One hour glucose test was normal.      Ultrasound at 20 weeks was normal.  US on 23 showed EFW of 9#4oz and BPP: 6/8 with LALITHA of 5            Pregnancy history:     OBSTETRIC HISTORY:    OB History    Para Term  AB Living   1 0 0 0 0 0   SAB IAB Ectopic Multiple Live Births   0 0 0 0 0      # Outcome Date GA Lbr Nithin/2nd Weight Sex Delivery Anes PTL Lv   1 Current                EDC: Estimated Date of Delivery: 23    Prenatal Labs:   Lab Results   Component Value Date    AS Negative  08/25/2022    HGB 13.0 08/25/2022       GBS Status:   No results found for: GBS    Active Problem List  Patient Active Problem List   Diagnosis     Acanthosis nigricans     Asthma     Morbid obesity (H)     Varicella     Post term pregnancy, antepartum condition or complication     Prolonged rupture of membranes     Meconium staining       Medication Prior to Admission  Medications Prior to Admission   Medication Sig Dispense Refill Last Dose     albuterol (PROAIR HFA/PROVENTIL HFA/VENTOLIN HFA) 108 (90 Base) MCG/ACT inhaler Inhale 2 puffs into the lungs every 6 hours as needed for shortness of breath, wheezing or cough   More than a month     beclomethasone HFA (QVAR REDIHALER) 80 MCG/ACT inhaler Inhale 1 puff into the lungs 2 times daily        Prenatal Vit-Fe Fumarate-FA (PNV PRENATAL PLUS MULTIVITAMIN) 27-1 MG TABS per tablet Take 1 tablet by mouth daily   Past Week   .        Maternal Past Medical History:     Past Medical History:   Diagnosis Date     Uncomplicated asthma                        Family History:     Family History   Problem Relation Age of Onset     Unknown/Adopted Father      Family history reviewed and updated in [facility]            Social History:     Social History     Tobacco Use     Smoking status: Former     Types: Cigarettes     Smokeless tobacco: Former   Vaping Use     Vaping status: Not on file   Substance Use Topics     Alcohol use: Not Currently            Review of Systems:   The Review of Systems is negative other than noted in the HPI          Physical Exam:     Vitals were reviewed  Patient Vitals for the past 12 hrs:   BP Temp Temp src Pulse Resp SpO2 Height Weight   04/22/23 2015 (!) 178/94 -- -- -- -- -- -- --   04/22/23 2010 (!) 172/90 -- -- -- -- -- -- --   04/22/23 1949 -- 98  F (36.7  C) Oral -- 18 -- -- --   04/22/23 1945 (!) 161/88 -- -- -- -- 100 % -- --   04/22/23 1930 (!) 146/77 -- -- -- -- -- -- --   04/22/23 1915 (!) 153/85 -- -- -- -- -- -- --   04/22/23 1900 (!)  "127/93 -- -- -- -- -- -- --   04/22/23 1845 (!) 163/101 -- -- -- -- -- -- --   04/22/23 1820 (!) 158/95 -- -- -- -- -- -- --   04/22/23 1810 (!) 143/91 -- -- -- -- -- -- --   04/22/23 1804 (!) 143/91 98.2  F (36.8  C) Oral 92 20 -- 1.6 m (5' 3\") 137 kg (302 lb)   04/22/23 1800 (!) 157/100 -- -- -- -- -- -- --     Constitutional:   awake, alert, cooperative, no apparent distress, but very uncomfortable with contractions     Lungs:   Decreased breath sounds with mild expiratory any breakthrough wheezing.  No crackles.     Cardiovascular:   Regular rate and rhythm, no murmur     Abdomen:   Morbidly obese.  No tender with palpation to the fundus.  No CVA tenderness.     Genitounirinary:   External Genitalia:  General appearance; normal, Hair distribution; normal, Lesions absent     Musculoskeletal:   no lower extremity pitting edema present     Neurologic:   Deep Tendon Reflexes: 2/2.      Cervix:   Membranes: SROM   Dilation: 2   Effacement: 80%   Station:-4   Consistency: soft   Position: Posterior  Presentation:Cephalic  Fetal Heart Rate Tracing: reactive and reassuring, variables moderate, decelerations none, Tier 1 (normal).  Irregular contraction every 3 to 7 minutes  Tocometer: external monitor                       Assessment:   Modesta Bill is a 42w4d pregnant female admitted for  1.  Post date at 42w4d based on accurate LMP and US at 8 week gestation  2.  Prolonged SROM - 44 hrs ago  3.  Moderate meconium stain  4.  Now she is Pre-eclampsia with severe feature  5.  GBS unknown  5.  Morbid Obesity  7.  Asthma - controlled with QVAR - last use of albuterol inhaler was > 6 months ago  8. Not immunized to Rubella    Reviewed the medical record from the midwife extensively.  Good prenatal care.  The pregnancy was complicated with morbid obesity.  JEANETTE was based on LMP which is consistent with ultrasound at 8 week gestation.  Prenatal lab blood type O+, the rest were normal except that she is not immune to " rubella. Hgb A1c was 5.4.  One hour glucose test was normal.      Ultrasound at 20 weeks was normal.  US on 23 showed EFW of 9#4oz and BPP: 6/8 with LALITHA of 5    Patient was very hesitant with the recommendation of treating for group B strep of unknown, augmentation of the labor and treatment for pre-eclampsia.  After the long discussion, she then agreed with antibiotic, antihypertensive medication and magnesium infusion.  She wanted to hold off on Pitocin augmentation for now.    I also discussed with her about the risk for shoulder dystocia due to postdates and potential macrosomia.  My estimate fetal weight is at least 9.5 pounds.  Ultrasound 3 days ago suggest that she is 9 pounds and 4 ounces.  I discussed with her about the potential complication associate with shoulder dystocia.  Informed her that  would prevent this    I had a long conversation about the potential complication associated with prolonged SROM as well as meconium.  I discussed with her the option of having a  versus attempting vaginal delivery with labor and augmentation; pros and cons of each option discussed.  She declined  and would like to continue with attempting vaginal delivery.  She was informed about the potential risks to the  which include infection and/or respiratory distress.          Plan:   Admit - see IP orders  Pain medication discussed - nitric and/or epidural  Strongly recommended augmentation with pitocin   Intrapartum care and discussed in details.  Discussed about indication for blood transfusion.       She is okay to be transfused if necessary.      Blood type and screen    Aggressive with postpartum hemorrhage prevention.     No Methergine or Hemabate   EFW today is about 9.5 pounds.   Adequate pelvic outlet appreciated  Attempt vaginal delivery, anticipate CS  A/P discussed and all of her questions were answered.      Beck Pena Mai, MD

## 2023-04-23 NOTE — PROGRESS NOTES
Pt started on magnesium. States that she would prefer . Provider notified and has conversation with patient. Agree to move forward with . Kary Mendez RN on 2023 at 9:12 PM

## 2023-04-23 NOTE — PROGRESS NOTES
Writer phone Dr. Henderson this morning about pt's PP blood loss. Plan of care at this time is to monitor patient closely over the next hour and update him at 0650 regarding this. Ashley Patel RN on 4/23/2023 at 5:54 AM

## 2023-04-23 NOTE — PLAN OF CARE
Goal Outcome Evaluation:  Vitals: /87, 144/84, 148/86, 148/78, 140/84 & 145/86; other vitals WNL. Fundus firm without massage, at umbilicus, midline. Bleeding on pads post partum 96, 24, 18, 178,142, 42 (500 ml). In PACU rectal cytotec 800 mcg, pitocin 2nd bag, and TXA administered.  No ambulation this shift. Pain in abdomen with coughing. Bonding with infant going well. Incision site clean dry and intact, well approximated, ice applied intermittently.

## 2023-04-23 NOTE — ANESTHESIA CARE TRANSFER NOTE
Patient: Modesta Bill    Procedure: Procedure(s):   SECTION       Diagnosis: Pre-eclampsia affecting pregnancy, antepartum [O14.90]  Prolonged rupture of membranes [O42.90]  Post term pregnancy, antepartum condition or complication [O48.0]  Meconium staining [P96.83]  Morbid obesity (H) [E66.01]  Diagnosis Additional Information: No value filed.    Anesthesia Type:   CSE     Note:    Oropharynx: oropharynx clear of all foreign objects and spontaneously breathing  Level of Consciousness: awake  Oxygen Supplementation: room air    Independent Airway: airway patency satisfactory and stable  Dentition: dentition unchanged  Vital Signs Stable: post-procedure vital signs reviewed and stable  Report to RN Given: handoff report given  Patient transferred to: PACU    Handoff Report: Identifed the Patient, Identified the Reponsible Provider, Reviewed the pertinent medical history, Discussed the surgical course, Reviewed Intra-OP anesthesia mangement and issues during anesthesia, Set expectations for post-procedure period and Allowed opportunity for questions and acknowledgement of understanding      Vitals:  Vitals Value Taken Time   /88 23 2345   Temp     Pulse 114 23 2354   Resp 37 23 2354   SpO2 99 % 23 2354   Vitals shown include unvalidated device data.    Electronically Signed By: SHALINI Dobbs CRNA  2023  11:54 PM

## 2023-04-24 ENCOUNTER — HOSPITAL ENCOUNTER (INPATIENT)
Facility: HOSPITAL | Age: 28
LOS: 2 days | Discharge: HOME | DRG: 776 | End: 2023-04-26
Attending: OBSTETRICS & GYNECOLOGY | Admitting: OBSTETRICS & GYNECOLOGY
Payer: COMMERCIAL

## 2023-04-24 VITALS
SYSTOLIC BLOOD PRESSURE: 153 MMHG | HEIGHT: 63 IN | HEART RATE: 98 BPM | TEMPERATURE: 97.6 F | OXYGEN SATURATION: 100 % | RESPIRATION RATE: 20 BRPM | WEIGHT: 293 LBS | BODY MASS INDEX: 51.91 KG/M2 | DIASTOLIC BLOOD PRESSURE: 104 MMHG

## 2023-04-24 PROCEDURE — 250N000011 HC RX IP 250 OP 636: Performed by: OBSTETRICS & GYNECOLOGY

## 2023-04-24 PROCEDURE — 120N000001 HC R&B MED SURG/OB

## 2023-04-24 PROCEDURE — 99238 HOSP IP/OBS DSCHRG MGMT 30/<: CPT | Performed by: FAMILY MEDICINE

## 2023-04-24 PROCEDURE — 250N000013 HC RX MED GY IP 250 OP 250 PS 637: Performed by: OBSTETRICS & GYNECOLOGY

## 2023-04-24 PROCEDURE — 250N000013 HC RX MED GY IP 250 OP 250 PS 637: Performed by: FAMILY MEDICINE

## 2023-04-24 PROCEDURE — 250N000011 HC RX IP 250 OP 636: Performed by: FAMILY MEDICINE

## 2023-04-24 RX ORDER — METOCLOPRAMIDE 10 MG/1
10 TABLET ORAL EVERY 6 HOURS PRN
Status: DISCONTINUED | OUTPATIENT
Start: 2023-04-24 | End: 2023-04-26 | Stop reason: HOSPADM

## 2023-04-24 RX ORDER — METOCLOPRAMIDE HYDROCHLORIDE 5 MG/ML
10 INJECTION INTRAMUSCULAR; INTRAVENOUS EVERY 6 HOURS PRN
Status: DISCONTINUED | OUTPATIENT
Start: 2023-04-24 | End: 2023-04-26 | Stop reason: HOSPADM

## 2023-04-24 RX ORDER — IBUPROFEN 800 MG/1
800 TABLET, FILM COATED ORAL EVERY 6 HOURS
Status: DISCONTINUED | OUTPATIENT
Start: 2023-04-25 | End: 2023-04-24

## 2023-04-24 RX ORDER — MISOPROSTOL 200 UG/1
400 TABLET ORAL
Status: DISCONTINUED | OUTPATIENT
Start: 2023-04-24 | End: 2023-04-26 | Stop reason: HOSPADM

## 2023-04-24 RX ORDER — ONDANSETRON 4 MG/1
4 TABLET, ORALLY DISINTEGRATING ORAL EVERY 6 HOURS PRN
Status: DISCONTINUED | OUTPATIENT
Start: 2023-04-24 | End: 2023-04-26 | Stop reason: HOSPADM

## 2023-04-24 RX ORDER — ONDANSETRON 2 MG/ML
4 INJECTION INTRAMUSCULAR; INTRAVENOUS EVERY 6 HOURS PRN
Status: DISCONTINUED | OUTPATIENT
Start: 2023-04-24 | End: 2023-04-26 | Stop reason: HOSPADM

## 2023-04-24 RX ORDER — NIFEDIPINE 10 MG/1
10 CAPSULE ORAL ONCE
Status: COMPLETED | OUTPATIENT
Start: 2023-04-24 | End: 2023-04-24

## 2023-04-24 RX ORDER — CARBOPROST TROMETHAMINE 250 UG/ML
250 INJECTION, SOLUTION INTRAMUSCULAR
Status: DISCONTINUED | OUTPATIENT
Start: 2023-04-24 | End: 2023-04-26 | Stop reason: HOSPADM

## 2023-04-24 RX ORDER — HYDROCORTISONE 25 MG/G
CREAM TOPICAL 3 TIMES DAILY PRN
Status: DISCONTINUED | OUTPATIENT
Start: 2023-04-24 | End: 2023-04-26 | Stop reason: HOSPADM

## 2023-04-24 RX ORDER — AMOXICILLIN 250 MG
2 CAPSULE ORAL 2 TIMES DAILY
Status: DISCONTINUED | OUTPATIENT
Start: 2023-04-24 | End: 2023-04-26 | Stop reason: HOSPADM

## 2023-04-24 RX ORDER — MODIFIED LANOLIN
OINTMENT (GRAM) TOPICAL
Status: DISCONTINUED | OUTPATIENT
Start: 2023-04-24 | End: 2023-04-26 | Stop reason: HOSPADM

## 2023-04-24 RX ORDER — SIMETHICONE 80 MG
80 TABLET,CHEWABLE ORAL 4 TIMES DAILY PRN
Status: DISCONTINUED | OUTPATIENT
Start: 2023-04-24 | End: 2023-04-26 | Stop reason: HOSPADM

## 2023-04-24 RX ORDER — AMOXICILLIN 250 MG
1 CAPSULE ORAL 2 TIMES DAILY
Status: DISCONTINUED | OUTPATIENT
Start: 2023-04-24 | End: 2023-04-26 | Stop reason: HOSPADM

## 2023-04-24 RX ORDER — DEXTROSE, SODIUM CHLORIDE, SODIUM LACTATE, POTASSIUM CHLORIDE, AND CALCIUM CHLORIDE 5; .6; .31; .03; .02 G/100ML; G/100ML; G/100ML; G/100ML; G/100ML
INJECTION, SOLUTION INTRAVENOUS CONTINUOUS
Status: DISCONTINUED | OUTPATIENT
Start: 2023-04-24 | End: 2023-04-26 | Stop reason: HOSPADM

## 2023-04-24 RX ORDER — PROCHLORPERAZINE MALEATE 10 MG
10 TABLET ORAL EVERY 6 HOURS PRN
Status: DISCONTINUED | OUTPATIENT
Start: 2023-04-24 | End: 2023-04-26 | Stop reason: HOSPADM

## 2023-04-24 RX ORDER — BISACODYL 10 MG
10 SUPPOSITORY, RECTAL RECTAL DAILY PRN
Status: DISCONTINUED | OUTPATIENT
Start: 2023-04-26 | End: 2023-04-26 | Stop reason: HOSPADM

## 2023-04-24 RX ORDER — KETOROLAC TROMETHAMINE 30 MG/ML
30 INJECTION, SOLUTION INTRAMUSCULAR; INTRAVENOUS EVERY 6 HOURS
Status: DISCONTINUED | OUTPATIENT
Start: 2023-04-24 | End: 2023-04-24

## 2023-04-24 RX ORDER — NIFEDIPINE 30 MG
30 TABLET, EXTENDED RELEASE ORAL DAILY
Status: DISCONTINUED | OUTPATIENT
Start: 2023-04-24 | End: 2023-04-26 | Stop reason: HOSPADM

## 2023-04-24 RX ORDER — PROCHLORPERAZINE 25 MG
25 SUPPOSITORY, RECTAL RECTAL EVERY 12 HOURS PRN
Status: DISCONTINUED | OUTPATIENT
Start: 2023-04-24 | End: 2023-04-26 | Stop reason: HOSPADM

## 2023-04-24 RX ORDER — OXYTOCIN/0.9 % SODIUM CHLORIDE 30/500 ML
340 PLASTIC BAG, INJECTION (ML) INTRAVENOUS CONTINUOUS PRN
Status: DISCONTINUED | OUTPATIENT
Start: 2023-04-24 | End: 2023-04-26 | Stop reason: HOSPADM

## 2023-04-24 RX ORDER — METHYLERGONOVINE MALEATE 0.2 MG/ML
200 INJECTION INTRAVENOUS
Status: DISCONTINUED | OUTPATIENT
Start: 2023-04-24 | End: 2023-04-26 | Stop reason: HOSPADM

## 2023-04-24 RX ORDER — OXYCODONE HYDROCHLORIDE 5 MG/1
5 TABLET ORAL EVERY 4 HOURS PRN
Status: DISCONTINUED | OUTPATIENT
Start: 2023-04-24 | End: 2023-04-26 | Stop reason: HOSPADM

## 2023-04-24 RX ORDER — NALOXONE HYDROCHLORIDE 0.4 MG/ML
0.4 INJECTION, SOLUTION INTRAMUSCULAR; INTRAVENOUS; SUBCUTANEOUS
Status: DISCONTINUED | OUTPATIENT
Start: 2023-04-24 | End: 2023-04-26 | Stop reason: HOSPADM

## 2023-04-24 RX ORDER — NALOXONE HYDROCHLORIDE 0.4 MG/ML
0.2 INJECTION, SOLUTION INTRAMUSCULAR; INTRAVENOUS; SUBCUTANEOUS
Status: DISCONTINUED | OUTPATIENT
Start: 2023-04-24 | End: 2023-04-26 | Stop reason: HOSPADM

## 2023-04-24 RX ORDER — LIDOCAINE 40 MG/G
CREAM TOPICAL
Status: DISCONTINUED | OUTPATIENT
Start: 2023-04-24 | End: 2023-04-26 | Stop reason: HOSPADM

## 2023-04-24 RX ORDER — MISOPROSTOL 200 UG/1
800 TABLET ORAL
Status: DISCONTINUED | OUTPATIENT
Start: 2023-04-24 | End: 2023-04-26 | Stop reason: HOSPADM

## 2023-04-24 RX ORDER — OXYTOCIN 10 [USP'U]/ML
10 INJECTION, SOLUTION INTRAMUSCULAR; INTRAVENOUS
Status: DISCONTINUED | OUTPATIENT
Start: 2023-04-24 | End: 2023-04-26 | Stop reason: HOSPADM

## 2023-04-24 RX ORDER — IBUPROFEN 800 MG/1
800 TABLET, FILM COATED ORAL EVERY 6 HOURS
Status: DISCONTINUED | OUTPATIENT
Start: 2023-04-24 | End: 2023-04-26 | Stop reason: HOSPADM

## 2023-04-24 RX ORDER — ACETAMINOPHEN 325 MG/1
975 TABLET ORAL EVERY 6 HOURS
Status: DISCONTINUED | OUTPATIENT
Start: 2023-04-24 | End: 2023-04-26 | Stop reason: HOSPADM

## 2023-04-24 RX ADMIN — ACETAMINOPHEN 975 MG: 325 TABLET ORAL at 23:48

## 2023-04-24 RX ADMIN — OXYCODONE HYDROCHLORIDE 5 MG: 5 TABLET ORAL at 23:48

## 2023-04-24 RX ADMIN — ACETAMINOPHEN 975 MG: 325 TABLET ORAL at 12:10

## 2023-04-24 RX ADMIN — KETOROLAC TROMETHAMINE 30 MG: 30 INJECTION, SOLUTION INTRAMUSCULAR at 00:50

## 2023-04-24 RX ADMIN — ACETAMINOPHEN 975 MG: 325 TABLET ORAL at 17:56

## 2023-04-24 RX ADMIN — NIFEDIPINE 30 MG: 30 TABLET, EXTENDED RELEASE ORAL at 16:16

## 2023-04-24 RX ADMIN — IBUPROFEN 800 MG: 800 TABLET, FILM COATED ORAL at 05:29

## 2023-04-24 RX ADMIN — KETOROLAC TROMETHAMINE 30 MG: 30 INJECTION, SOLUTION INTRAMUSCULAR; INTRAVENOUS at 15:10

## 2023-04-24 RX ADMIN — NIFEDIPINE 10 MG: 10 CAPSULE ORAL at 05:29

## 2023-04-24 RX ADMIN — SENNOSIDES AND DOCUSATE SODIUM 2 TABLET: 50; 8.6 TABLET ORAL at 12:11

## 2023-04-24 RX ADMIN — OXYCODONE HYDROCHLORIDE 5 MG: 5 TABLET ORAL at 05:29

## 2023-04-24 RX ADMIN — ACETAMINOPHEN 975 MG: 325 TABLET ORAL at 02:30

## 2023-04-24 RX ADMIN — IBUPROFEN 800 MG: 800 TABLET ORAL at 20:30

## 2023-04-24 ASSESSMENT — ACTIVITIES OF DAILY LIVING (ADL)
ADLS_ACUITY_SCORE: 18
TOILETING_ISSUES: NO
ADLS_ACUITY_SCORE: 19
ADLS_ACUITY_SCORE: 18
WEAR_GLASSES_OR_BLIND: NO
ADLS_ACUITY_SCORE: 18
FALL_HISTORY_WITHIN_LAST_SIX_MONTHS: NO
ADLS_ACUITY_SCORE: 18
DRESSING/BATHING_DIFFICULTY: NO
DIFFICULTY_EATING/SWALLOWING: NO
ADLS_ACUITY_SCORE: 19
ADLS_ACUITY_SCORE: 19
ADLS_ACUITY_SCORE: 18
CONCENTRATING,_REMEMBERING_OR_MAKING_DECISIONS_DIFFICULTY: NO
DOING_ERRANDS_INDEPENDENTLY_DIFFICULTY: NO
WALKING_OR_CLIMBING_STAIRS_DIFFICULTY: NO
ADLS_ACUITY_SCORE: 18
ADLS_ACUITY_SCORE: 18
ADLS_ACUITY_SCORE: 19

## 2023-04-24 NOTE — PLAN OF CARE
Goal Outcome Evaluation:  Pt/s BP since stopping magnesium infusion has been in the 140's/60-80's range. When infant started having respiratory distress BP went to 164/93. BP's reassessed at 143/91, 147/93, 148/79, 147/87, & 145/94. Patient denies headache, vision changes, SOB, chest pain. Does have +2 edema, which she had during the day as well. Mother is currently in the nursery with infant and transport team. Ashley Patel RN on 4/24/2023 at 5:20 AM

## 2023-04-24 NOTE — PROGRESS NOTES
Patient agrees to transport to [facility] postpartum unit as baby is being transferred to NICU. Patient's BPs 140s/90s, Denies HA/change of vision. No clonus. Reflexes +1 to +2. Dependent edema bilaterally. IV remains saline locked.

## 2023-04-24 NOTE — PROGRESS NOTES
Transport team arrives for patient. Procardia 10 mg, ibu, and oxycodone administered. At this time mother would like to wait for infant to transport too. Ashley Patel RN on 4/24/2023 at 5:34 AM

## 2023-04-24 NOTE — DISCHARGE SUMMARY
[facility] Discharge Summary    Modesta Bill MRN# [id number]   Age: 27 year old YOB: 1995     Date of Admission:  2023  Date of Discharge::  2023  Admitting Physician:  Beck Pena Mai, MD  Discharge Physician:  Beck Pena Mai, MD     Home clinic: [facility]          Admission Diagnoses:   Post term pregnancy at 42w4d           Discharge Diagnosis:   Pre-eclampsia affecting pregnancy, antepartum [O14.90]  Prolonged rupture of membranes [O42.90]  Post term pregnancy, antepartum condition or complication [O48.0]  Meconium staining [P96.83]  Morbid obesity (H) [E66.01]  Primary  section  Anemia due to acute blood loss  Mild persistent Asthma - controlled          Procedures:   Procedure(s): Primary low transverse  section                Medications Prior to Admission:     Medications Prior to Admission   Medication Sig Dispense Refill Last Dose     albuterol (PROAIR HFA/PROVENTIL HFA/VENTOLIN HFA) 108 (90 Base) MCG/ACT inhaler Inhale 2 puffs into the lungs every 6 hours as needed for shortness of breath, wheezing or cough   More than a month     beclomethasone HFA (QVAR REDIHALER) 80 MCG/ACT inhaler Inhale 1 puff into the lungs 2 times daily        Prenatal Vit-Fe Fumarate-FA (PNV PRENATAL PLUS MULTIVITAMIN) 27-1 MG TABS per tablet Take 1 tablet by mouth daily   Past Week             Discharge Medications:     Current Discharge Medication List      CONTINUE these medications which have NOT CHANGED    Details   albuterol (PROAIR HFA/PROVENTIL HFA/VENTOLIN HFA) 108 (90 Base) MCG/ACT inhaler Inhale 2 puffs into the lungs every 6 hours as needed for shortness of breath, wheezing or cough      beclomethasone HFA (QVAR REDIHALER) 80 MCG/ACT inhaler Inhale 1 puff into the lungs 2 times daily      Prenatal Vit-Fe Fumarate-FA (PNV PRENATAL PLUS MULTIVITAMIN) 27-1 MG TABS per tablet Take 1 tablet by mouth daily        Please see inpatient med  for further details               Consultations:   No consultations were requested during this admission          Brief History of Labor or Admission:   Modesta Bill is a 27 year old female with  at 42w4d who was admitted for prolonged SROM with meconium staining and postdates.  Attempted home delivery with midwife.  Been ruptured spontaneously for 42 hours.  Contractions started about 24 hours ago, getting more intense but still irregular, about every 3-7 minutes.  The midwife noted meconium stained about 6 hours ago and then again couple hours ago and therefore she was recommended to be transferred here for further management.  No bleeding.  Normal fetal movement.  No headache, dizziness, acute change in her vision.  No upper right quadrant pain.  No fever or chills.  She is morbidly obese.  No history of high blood pressure.     Reviewed the medical record from the midwife extensively.  Good prenatal care.  The pregnancy was complicated with morbid obesity.  JEANETTE was based on LMP which is consistent with ultrasound at 8 week gestation.  This is a planned pregnancy.  Prenatal lab blood type O+, the rest were normal except that she is not immune to rubella. Hgb A1c was 5.4.  One hour glucose test was normal.       Ultrasound at 20 weeks was normal.  US on 23 showed EFW of 9#4oz and BPP: 6/8 with LALITHA of 5    She was noted pre-eclamptic and therefore magnesium infusion started (just prior to delivery).  Received one dose of Penicillin for unknown GBS.  Thick meconium noted on the physical exam.  NST was reactive.  Cervical check:  2/80/-4.  She was having very painful contractions.  CS was called due to failure to progress.      Received Ancefl and Zithromax before the surgery.  No complication with the procedure.  EBL was around 1000 ml.  Pre-eclamptic labs were normal.           Hospital Course:     Overall Modesta is doing well.  She received mag infusion for 24 hrs after the delivery - tolerating  it well.  BP were stable and normal while on the mag but increased to 140s/90s since stopped the mag infusion. No pre-eclamptic symptoms.  No history of high blood pressure.  No chest pain or SOB.  Tolerating PO intake well.  Feeling much better after being off the mag infusion.  Pain is under control with Ibuprofen and Percocet.  She recovered as anticipated and experienced no post-operative complications. Vaginal bleeding is similar to peak menstrual flow.  Voiding without difficulty.  Ambulating well and tolerating a normal diet.  No fever or significant wound drainage.  Breastfeeding well.   No concern from nursing staff.  She was transferred to [facility] for close monitoring and to be close to her  who needs NICU care.  Appreciate Dr. Armstrong's assistance who is graciously accepting her care.    She was given a 10 mg Procardia before discharged.  She has been stressed out with her 's acute medical concern.    Vitals:    23 0225 23 0240 23 0254 23 0419   BP: (!) 147/93 (!) 148/79 (!) 147/87 (!) 145/94   BP Location: Right arm Right arm Right arm Right arm   Patient Position: Chair Chair Chair Chair   Cuff Size: Adult Regular Adult Regular Adult Regular Adult Regular   Pulse:    95   Resp:    20   Temp:       TempSrc:       SpO2:    98%   Weight:       Height:          She was transported to [place] via ambulance - stable condition.      Post-partum hemoglobin:   Hemoglobin   Date Value Ref Range Status   2023 9.2 (L) 11.7 - 15.7 g/dL Final             Discharge Instructions and Follow-Up:   Discharge diet: Advance to a regular diet as tolerated   Discharge activity: As tolerated now    Discharge follow-up: With midwife after discharged from [facility] as will be recommended   Wound care: Drink plenty of fluids  Ice to area for comfort  Keep wound clean and dry           Discharge Disposition:   Transferred to [facility] in [facility]      Attestation:  I  have reviewed today's vital signs, notes, medications, labs and imaging.    Beck Pena Mai, MD

## 2023-04-24 NOTE — PROGRESS NOTES
RN called Dr. Henderson about elevated BP's, he will be in to check on infant and mother shortly. Ashley Patel RN on 4/24/2023 at 1:55 AM

## 2023-04-24 NOTE — H&P
OB HISTORY AND PHYSICAL UPDATE ADMISSION EXAM    Mariangel Bill  1995  [id number]    HPI:  who is POD #2 from [facility].   Secondary to meconium, [facility] and [facility] and fiance requested transfer to be nearer to baby.       She has been treated for pre-E with severe features.    Past Medical History:   Diagnosis Date     Asthma      Uncomplicated asthma      Obesity    OB History    Para Term  AB Living   2 1 1 0 1 1   SAB IAB Ectopic Multiple Live Births   0 0 0 0 1      # Outcome Date GA Lbr Nithin/2nd Weight Sex Delivery Anes PTL Lv   2 Term 23 42w4d  4.355 kg (9 lb 9.6 oz) M CS-LTranv Spinal N ELIZABETH      Complications: Dysfunctional Labor, Preeclampsia/Hypertension      Name: LYNDA MEHTA,JEF-MARIANGEL      Apgar1: 7  Apgar5: 9   1 AB                Prenatal Complications Hypertension, Pre-eclampsia, Obesity and Anemia    Exam:    LMP 2022 (Exact Date)         HEENT          WNL              Heart              WNL               Lungs             WNL                      Abdomen        WNL                       Extremities     WNL                      Assessment: POD #2.      Plan: BP management. Pain control.  Ashthma maintenance.      Merry Yuen DO, FACOG  2023 8:25 AM

## 2023-04-24 NOTE — PROVIDER NOTIFICATION
"Dr. Yuen updated with Bps post Nifedipine 30mg ER admininstered as 149/89 and 134/76. Continue to monitor BP q shift or with changes/symptoms. Pt denies headache, heart burn/epigastric discomfort, vision changes at this time. +2 reflexes and no clonus. +2-+3 edema in feet and ankles. Pt reports a \"twinging\" pain in her R lower leg near the knee, no redness/swelling/warmth in that area currently. No change in quality or quantity of pain when moving foot or ankle.   "

## 2023-04-24 NOTE — CONSULTS
NOTE COPIED FROM 'S CHART:    INITIAL SOCIAL WORK NICU ASSESSMENT      DATA:    SW met with pts mother, Modesta, in her postpartum room. Pts father, Dave, was present but asleep on the couch during SW visit.     Reason for Social Work Consult: NICU admission     Living Situation: Modesta reports living with Dave in [place], [place]. The pt is their first baby.     Social Support: Modesta reports having good support from family and friends. She reports that their families live in the [facility] which is not far from them. She reports that her midwife is also a support.     Employment: Modesta reports that she was employed until about a month ago but has been off since then and does not plan to return to work. She reports plan to stay home with the pt. Modesta reports that Dave works full-time as a . She reports that he drives local routes so is home every night. She reports that he is taking three weeks off and has control over his schedule if additional time is needed.     Insurance: [employer] through Dave's employment.     Source of Financial Support: Employment. Modesta denies being on any county benefits. She reports that she has checked and is over-income for WIC and MA. Modesta denies any financial concerns.     Mental Health History: Modesta denies any history of mental health concerns.     History of Postpartum Mood Disorders: NA     Chemical Health History: Chart reviewed and no tox screen sent on the pt or Modesta.     INTERVENTION:        RENATE completed chart review and collaborated with the multidisciplinary team.     Psychosocial Assessment     Introduction to NICU  role and scope of practice     Discussed NICU experience and gave NICU welcome card    Reviewed Hospital and Community Resources     Assessed Chemical Health History and Current Symptoms     Assessed Mental Health History and Current Symptoms     Identified stressors, barriers and family  "concerns     Provided support and active empathetic listening and validation.     Provided psychoeducation on  mood and anxiety disorders, assessed for any current symptoms or history     ASSESSMENT:      Coping: When SW initially entered the room and introduced self, Modesta reports that she is doing \"as good as can be expected.\" She had just finished pumping and was sitting up in the chair in the room. Later in the conversation she notes that she is stressed about the situation. She reports that they had planned a water birth at home and things have not gone the way that she had planned or expected. She reports that Daev was also struggling. SW validated her feelings and provided support and also validated that Dave is going through these emotions with her as well. SW encouraged her to be gentle with herself as she processes what has happened. Modesta reports that she does not remember the last time she slept- she and the pt were transferred to [facility] in the early morning hours today. SW encouraged to sleep and rest when she gets the opportunity.     Affect: Calm, appropriate     Mood: congruent with affect     Motivation/Ability to Access Services: Modesta appears able to access services as needed and does not identify any needs at this time. SW provided parent resources for her to use as needed. Discussed that SW can provide additional resources more specific to her area if needed and can check back in about possible needs/resources related to the distance they live from the hospital if that is a barrier for them. Modesta reports understanding. SW will plan to follow and continue to assess for needs throughout NICU admission.     Assessment of Support System:  Strong, per Modesta's report     Level of engagement with SW: High     Assessment of parental risk for PMAD: Minimal but increased due to stressful circumstances related to labor, delivery and early postpartum period along with NICU admission. " This slightly increaesd risk was discussed with Modesta. SW provided brief education on postpartum mood concerns including when to seek help. Modesta reports understanding.     Strengths: strong support system, prepared for the pt at home, financially stable     Vulnerabilities:  live 1.5 hours from [facility], complicated labor/delivery, NICU admission     Identified Barriers: None at this time.     PLAN:    SW provided SW NICU welcome information. Discussed plan for SW to follow and check in periodically throughout NICU admission. Modesta reports understanding and agreement.     AMBER Dos Santos on 4/24/2023 at 10:41 AM

## 2023-04-24 NOTE — PROGRESS NOTES
"Pt stated that she felt \"foggy\", Dr. Henderson updated with patient status, no new orders at this time. RN asked for clarification of vital checks post magnesium, VORB/TORB per Dr. Henderson q1h x3, x2h x4. Ashley Patel RN on 4/23/2023 at 10:02 PM    "

## 2023-04-24 NOTE — PLAN OF CARE
Problem: Postpartum ( Delivery)  Goal: Successful Maternal Role Transition  Outcome: Progressing   Goal Outcome Evaluation:         Vitals stable - /86  Mom has pumped and brought milk to NICU to see baby  Requested SCDs on, abdominal binder given for comfort  Resting in between cares

## 2023-04-25 LAB — HGB BLD-MCNC: 8.8 G/DL (ref 11.7–15.7)

## 2023-04-25 PROCEDURE — 85018 HEMOGLOBIN: CPT | Performed by: OBSTETRICS & GYNECOLOGY

## 2023-04-25 PROCEDURE — 36415 COLL VENOUS BLD VENIPUNCTURE: CPT | Performed by: OBSTETRICS & GYNECOLOGY

## 2023-04-25 PROCEDURE — 250N000013 HC RX MED GY IP 250 OP 250 PS 637: Performed by: OBSTETRICS & GYNECOLOGY

## 2023-04-25 PROCEDURE — 120N000001 HC R&B MED SURG/OB

## 2023-04-25 RX ADMIN — NIFEDIPINE 30 MG: 30 TABLET, EXTENDED RELEASE ORAL at 08:42

## 2023-04-25 RX ADMIN — ACETAMINOPHEN 975 MG: 325 TABLET ORAL at 14:23

## 2023-04-25 RX ADMIN — ACETAMINOPHEN 975 MG: 325 TABLET ORAL at 08:16

## 2023-04-25 RX ADMIN — IBUPROFEN 800 MG: 800 TABLET ORAL at 08:16

## 2023-04-25 RX ADMIN — IBUPROFEN 800 MG: 800 TABLET ORAL at 14:23

## 2023-04-25 RX ADMIN — OXYCODONE HYDROCHLORIDE 5 MG: 5 TABLET ORAL at 21:09

## 2023-04-25 RX ADMIN — IBUPROFEN 800 MG: 800 TABLET ORAL at 02:16

## 2023-04-25 RX ADMIN — IBUPROFEN 800 MG: 800 TABLET ORAL at 21:09

## 2023-04-25 ASSESSMENT — ACTIVITIES OF DAILY LIVING (ADL)
ADLS_ACUITY_SCORE: 19
ADLS_ACUITY_SCORE: 18
ADLS_ACUITY_SCORE: 19
ADLS_ACUITY_SCORE: 18
ADLS_ACUITY_SCORE: 19
ADLS_ACUITY_SCORE: 19
ADLS_ACUITY_SCORE: 18
ADLS_ACUITY_SCORE: 19
ADLS_ACUITY_SCORE: 18
ADLS_ACUITY_SCORE: 18

## 2023-04-25 NOTE — DISCHARGE INSTRUCTIONS
Warning Signs after Having a Baby    Keep this paper on your fridge or somewhere else where you can see it.    Call your provider if you have any of these symptoms up to 12 weeks after having your baby.    Thoughts of hurting yourself or your baby  Pain in your chest or trouble breathing  Severe headache not helped by pain medicine  Eyesight concerns (blurry vision, seeing spots or flashes of light, other changes to eyesight)  Fainting, shaking or other signs of a seizure    Call 9-1-1 if you feel that it is an emergency.     The symptoms below can happen to anyone after giving birth. They can be very serious. Call your provider if you have any of these warning signs.    My provider s phone number: _______________________    Losing too much blood (hemorrhage)    Call your provider if you soak through a pad in less than an hour or pass blood clots bigger than a golf ball. These may be signs that you are bleeding too much.    Blood clots in the legs or lungs    After you give birth, your body naturally clots its blood to help prevent blood loss. Sometimes this increased clotting can happen in other areas of the body, like the legs or lungs. This can block your blood flow and be very dangerous.     Call your provider if you:  Have a red, swollen spot on the back of your leg that is warm or painful when you touch it.   Are coughing up blood.     Infection    Call your provider if you have any of these symptoms:  Fever of 100.4 F (38 C) or higher.  Pain or redness around your stitches if you had an incision.   Any yellow, white, or green fluid coming from places where you had stitches or surgery.    Mood Problems (postpartum depression)    Many people feel sad or have mood changes after having a baby. But for some people, these mood swings are worse.     Call your provider right away if you feel so anxious or nervous that you can't care for yourself or your baby.    Preeclampsia (high blood pressure)    Even if you  didn't have high blood pressure when you were pregnant, you are at risk for the high blood pressure disease called preeclampsia. This risk can last up to 12 weeks after giving birth.     Call your provider if you have:   Pain on your right side under your rib cage  Sudden swelling in the hands and face    Remember: You know your body. If something doesn't feel right, get medical help.     For informational purposes only. Not to replace the advice of your health care provider. Copyright 2020 [facility]. All rights reserved. Clinically reviewed by Diana Romero, RNC-OB, MSN. [employer] 360813 - Rev 02/23.  Call or return to the hospital if BP is greater than 160/110 or if your headache worsens.

## 2023-04-25 NOTE — PROGRESS NOTES
Csection - Post operative day 3    ASSESSMENT: PLAN:   POD#3    Transfer from [place] due to NICU   Primary csection for  fetal intolerance to labor  Preeclampsia with severe features- s/p 24 hours of post partum magensium   Postpartum hypertension- on nifedipine xl 30 mg - will continue to watch to see if needs further medication.  Acute blood loss anemia- doing well- discussed s/s with patient- will monitor     Continue routine cares   aniticipate discharge in am    SUBJECTIVE:    The patient feels well: Catheter is out, bleeding decreased, tolerating normal diet, and passing flatus.  Pain is well controlled. The patient has no emotional concerns.  The baby is well and being fed    OBJECTIVE:  BP (!) 143/78 (BP Location: Right arm)   Pulse 93   Temp 98.2  F (36.8  C) (Oral)   Resp 16   LMP 06/28/2022 (Exact Date)   SpO2 98%           Incision- dressing dry   Ext- nontender      Lab  Hemoglobin   Date Value Ref Range Status   04/25/2023 8.8 (L) 11.7 - 15.7 g/dL Final   ]        Lenka Emerson MD  [facility]  [phone]

## 2023-04-25 NOTE — PLAN OF CARE
"VSS, BP borderline high. +2-+3 edema bilaterally in lower extremities. Discussed SCDs, and elevating legs in bed. Some right upper quadrant pain, but no other symptoms of preeclampsia present. Area on right anterior shin where patient feels a \"twinging\" sensation, no redness, pain or warmth observed. Bleeding is light, denies clots or heavy bleeding. Some incontinence when voiding, patient educated about emptying bladder more frequently. Incision is open to air and approximated. Interdry applied to pannus around incision to help with moisture control.  Pumping for baby in NICU, nipples uncomfortable but not painful. Birth certificate and mood assessment completed at [place].     Goal Outcome Evaluation:  Problem: Plan of Care - These are the overarching goals to be used throughout the patient stay.    Goal: Optimal Comfort and Wellbeing  Outcome: Progressing  Intervention: Monitor Pain and Promote Comfort  Recent Flowsheet Documentation  Taken 4/24/2023 2300 by Jacquelyn Barrera, RN  Pain Management Interventions: medication (see MAR)  Intervention: Provide Person-Centered Care  Recent Flowsheet Documentation  Taken 4/24/2023 2348 by Jacquelyn Barrera, RN  Trust Relationship/Rapport:    care explained    choices provided    questions encouraged    questions answered                           "

## 2023-04-25 NOTE — PROGRESS NOTES
Dr Emerson updated on pt BP. No new orders at this time. Dr will be in shortly to assess and to make a further plan with patient

## 2023-04-25 NOTE — PROGRESS NOTES
Procardia to be given, BP obtained before medication administrated. CNA reported high BP to RN during her vital assessment this morning. Patient asymptomatic at this time. RN re-checks BP and uses a larger BP cuff (adult XL). BP reading noted to be lower then initial check and re-check. RN will update OB, and will re-assess in 4 hours or sooner if needed.

## 2023-04-25 NOTE — PLAN OF CARE
Problem: Postpartum ( Delivery)  Goal: Successful Maternal Role Transition  Outcome: Progressing  Goal: Effective Bowel Elimination  Outcome: Progressing  Goal: Effective Urinary Elimination  Outcome: Progressing     Problem: Breastfeeding  Goal: Effective Breastfeeding  Outcome: Progressing     Problem: Hypertensive Disorders in Pregnancy  Goal: Maternal-Fetal Stabilization  Outcome: Progressing     Goal Outcome Evaluation:    VSS since Nifedipine 30mg ER, FFU with light lochia. BM x2 this shift. Ambulating in room and voiding independently. Encouraged to walk short distances in winter when visiting baby in NICU. Pumping every 3 hours and taking milk to NICU. Coyle and supportive spouse at bedside.

## 2023-04-25 NOTE — LACTATION NOTE
"This note was copied from a baby's chart.  This writer met with Modesta per lactation order and her request, in the NICU.  She is pumping her breasts at least 8 times in 24 hours with the hospital grade breast pump.  This writer educated her on the Maintain program, as she is pumping > 40 ml per session.      She has a \"hands free\" breast pump at home but states she does not like this pump.  She was instructed to call her insurance to see what they would cover.      We discussed renting a hospital grade breast pump for one month out of pocket if insurance does not cover the pump.  She could also get a standard breast pump if insurance covers, such as a Spectra or a Medela.  She will contact her insurance and see what her options are.      She states she has a midwife that knows about breast pumps and she may speak with her before deciding on a breast pump.  She has an order in the computer for a hospital grade breast pump, prn, and can take that order to any DME and rent a hospital grade breast pump at her discharge.      She verbalizes understanding of all education given.  She denies any further questions.      "

## 2023-04-26 VITALS
OXYGEN SATURATION: 99 % | HEART RATE: 102 BPM | TEMPERATURE: 97.9 F | SYSTOLIC BLOOD PRESSURE: 132 MMHG | RESPIRATION RATE: 18 BRPM | BODY MASS INDEX: 50.02 KG/M2 | WEIGHT: 282.4 LBS | DIASTOLIC BLOOD PRESSURE: 76 MMHG

## 2023-04-26 PROBLEM — O13.9 GESTATIONAL HYPERTENSION: Status: ACTIVE | Noted: 2023-04-26

## 2023-04-26 PROCEDURE — 250N000013 HC RX MED GY IP 250 OP 250 PS 637: Performed by: OBSTETRICS & GYNECOLOGY

## 2023-04-26 PROCEDURE — 250N000011 HC RX IP 250 OP 636: Performed by: OBSTETRICS & GYNECOLOGY

## 2023-04-26 RX ORDER — ACETAMINOPHEN 325 MG/1
975 TABLET ORAL EVERY 6 HOURS
COMMUNITY
Start: 2023-04-26 | End: 2023-05-03

## 2023-04-26 RX ORDER — FUROSEMIDE 10 MG/ML
10 INJECTION INTRAMUSCULAR; INTRAVENOUS ONCE
Status: COMPLETED | OUTPATIENT
Start: 2023-04-26 | End: 2023-04-26

## 2023-04-26 RX ORDER — OXYCODONE HYDROCHLORIDE 5 MG/1
5 TABLET ORAL EVERY 4 HOURS PRN
Qty: 12 TABLET | Refills: 0
Start: 2023-04-26 | End: 2023-04-26

## 2023-04-26 RX ORDER — OXYCODONE HYDROCHLORIDE 5 MG/1
5 TABLET ORAL EVERY 4 HOURS PRN
Qty: 12 TABLET | Refills: 0 | Status: SHIPPED | OUTPATIENT
Start: 2023-04-26 | End: 2023-05-03

## 2023-04-26 RX ORDER — AMOXICILLIN 250 MG
1 CAPSULE ORAL 2 TIMES DAILY PRN
COMMUNITY
Start: 2023-04-26 | End: 2023-05-03

## 2023-04-26 RX ORDER — NIFEDIPINE 30 MG
30 TABLET, EXTENDED RELEASE ORAL DAILY
Qty: 30 TABLET | Refills: 1 | Status: SHIPPED | OUTPATIENT
Start: 2023-04-27

## 2023-04-26 RX ORDER — IBUPROFEN 800 MG/1
800 TABLET, FILM COATED ORAL EVERY 6 HOURS
COMMUNITY
Start: 2023-04-26

## 2023-04-26 RX ADMIN — NIFEDIPINE 30 MG: 30 TABLET, EXTENDED RELEASE ORAL at 08:35

## 2023-04-26 RX ADMIN — IBUPROFEN 800 MG: 800 TABLET ORAL at 12:37

## 2023-04-26 RX ADMIN — ACETAMINOPHEN 975 MG: 325 TABLET ORAL at 08:13

## 2023-04-26 RX ADMIN — FUROSEMIDE 10 MG: 10 INJECTION, SOLUTION INTRAMUSCULAR; INTRAVENOUS at 10:48

## 2023-04-26 ASSESSMENT — ACTIVITIES OF DAILY LIVING (ADL)
ADLS_ACUITY_SCORE: 18

## 2023-04-26 NOTE — PLAN OF CARE
Problem: Plan of Care   Goal: Plan of Care Review  Outcome: Met     VSS, ex hypertensive, MD aware. Pt given scheduled nifedipine and IV lasix as ordered. The pt is having adequate urine output. Fundus firm. Pt reports tolerable pain control with tylenol and ibuprofen use. Pt denied all s/s of preeclampsia until 1230 when pt reported a dull headache, ibuprofen administered and pt reported that the headache improved after pain medications, MD notified. Reflexes normal, no clonus. Pt is pumping Q 2-3 hours and bringing  pumped milk to infant in the NICU, bonding well with baby. Discharge instructions and warning signs given to pt and spouse and pt and spouse verbalized understanding. Pt also given blood pressure instructions and verbalized understanding and the pt stated she has a bp cuff at home. Pt completed PPMA at previous hospital admission.

## 2023-04-26 NOTE — PROVIDER NOTIFICATION
Notified Dr. Chamberlain that the pt has a dull headache that has gotten better after ibuprofen administration but is still lingering. Is pt still ok to discharge? Last bp was 132/76. Denies all other symptoms of preeclampsia. No clonus.     Per Dr. Chamberlain: Yes, pt can discharge.

## 2023-04-26 NOTE — PROVIDER NOTIFICATION
Patient in NICU at bedside since 1530, she was holding baby and not ready to go to her room. Assessment done in NICU baby room per her request

## 2023-04-26 NOTE — LACTATION NOTE
Lactation consultant to patient room to [facility] grade rental pump. Instruction on use and care. Encouraged mom to call lactation prn when she is visiting the NICU. Mom states understanding.

## 2023-04-26 NOTE — PROGRESS NOTES
Per Lizz Tinsley RN, patient is ready to discharge. AVS was given and signed, discharge education given.    Patient notified this writer that she was ready to discharge and patient was escorted off unit.

## 2023-04-26 NOTE — PLAN OF CARE
Assessment findings were within normal range. Blood pressures remained under 160/110. Patient fundus is firm at U and bleeding is light. Patient is ambulating independently and voiding without difficulty. Incision is well approximated with mild bruising. Patient is visiting infant in NICU and bringing EBM.

## 2023-04-26 NOTE — DISCHARGE SUMMARY
HOSPITAL DISCHARGE SUMMARY - C SECTION     NAME: Modesta Bill   : 1995    MRN: [id number]    PCP: Buddy Diop    ADMISSION DATE:  2023  DELIVERY DATE:   23      DISCHARGE DATE:  2023    REASON FOR ADMISSION: postpartum transfer as infant transferred to NICU at [facility], higher level NICU    DIAGNOSIS:    1.  Birth   2. Apgars of 7   at 1 minute, 9  at 5 minutes  3. Weight (oz):  153.62  oz.  3.  Gestational HTN    HISTORY OF PRESENT ILLNESS AND HOSPITAL COURSE: Modesta Bill  is a 27 year old,  female who underwent  section at outside hospital and was transferred here for postpartum care and to be near infant in NICU. Postoperative course was remarkable for acute blood loss anemia, hemoglobin santiago at 8.8 from 12.o pre-op this was managed with IVF support and monitoring of vital signs. Also noted to have hypertension without evidence of lab abnormalities managed on nifedipine. Patient is now asymptomatic and vital signs are stable. She denies chest pain, shortness of breath or dizziness. Patient has a headache that comes and goes, no change in vision and no upper abdominal pain.  On the day of discharge patient was tolerating diet, pain was controlled with oral medications, she was voiding and passing gas and had a bm.    LABS:  Lab Results   Component Value Date    HGB 8.8 (L) 2023       EXAM:  Blood pressure 132/76, pulse 102, temperature 97.9  F (36.6  C), temperature source Oral, resp. rate 18, last menstrual period 2022, SpO2 99 %, unknown if currently breastfeeding.  General: NAD  Abdomen: Soft, non-tender  Uterine fundus: Firm, non-tender  Incision: open to air and intact  Extremities: No pain, 2+ edema bilaterally    DISPOSITION:  Home    DISCHARGE CONDITION: Good/Stable    DISCHARGE MEDICATIONS:      Review of your medicines      START taking      Dose / Directions   acetaminophen 325 MG tablet  Commonly known as:  TYLENOL      Dose: 975 mg  Take 3 tablets (975 mg) by mouth every 6 hours  Refills: 0     ibuprofen 800 MG tablet  Commonly known as: ADVIL/MOTRIN      Dose: 800 mg  Take 1 tablet (800 mg) by mouth every 6 hours  Refills: 0     NIFEdipine ER 30 MG 24 hr tablet  Commonly known as: ADALAT CC      Dose: 30 mg  Start taking on: April 27, 2023  Take 1 tablet (30 mg) by mouth daily  Quantity: 30 tablet  Refills: 1     oxyCODONE 5 MG tablet  Commonly known as: ROXICODONE      Dose: 5 mg  Take 1 tablet (5 mg) by mouth every 4 hours as needed for moderate pain  Quantity: 12 tablet  Refills: 0     senna-docusate 8.6-50 MG tablet  Commonly known as: SENOKOT-S/PERICOLACE      Dose: 1 tablet  Take 1 tablet by mouth 2 times daily as needed for constipation  Refills: 0        CONTINUE these medicines which have NOT CHANGED      Dose / Directions   albuterol 108 (90 Base) MCG/ACT inhaler  Commonly known as: PROAIR HFA/PROVENTIL HFA/VENTOLIN HFA      Dose: 2 puff  Inhale 2 puffs into the lungs every 6 hours as needed for shortness of breath, wheezing or cough  Refills: 0     PNV Prenatal Plus Multivitamin 27-1 MG Tabs per tablet      Dose: 1 tablet  Take 1 tablet by mouth daily  Refills: 0     Qvar RediHaler 80 MCG/ACT inhaler  Generic drug: beclomethasone HFA      Dose: 1 puff  Inhale 1 puff into the lungs 2 times daily  Refills: 0           Where to get your medicines      These medications were sent to [facility] [address] [address]    Phone: [phone]     NIFEdipine ER 30 MG 24 hr tablet     Some of these will need a paper prescription and others can be bought over the counter. Ask your nurse if you have questions.    You don't need a prescription for these medications    acetaminophen 325 MG tablet    ibuprofen 800 MG tablet    senna-docusate 8.6-50 MG tablet           DISCHARGE PLAN:   - Follow up with  MD, in 1 week  - Take  medication as prescribed  - Physical activity: As tolerated, no heavy lifting. Pelvic rest.  - Diet:  Regular  - Medication:  Please see MAR  - Warning signs discussed with patient about when to call the clinic/hospital- specifically instructed to self montor Bps at home  - All questions and concerns were answered for the patient prior to discharge.       Jessica Chamberlain MD FACOG  [facility]  [phone]    I saw the patient on the date of discharge  Total time spent for discharge on date of discharge: 20 minutes

## 2023-04-26 NOTE — PROVIDER NOTIFICATION
Notified Dr. Ashley Chamberlain at 0845 that the pt has had two bp >140. First bp was 141/88, rechecked 15 minutes later and was 138/83, rechecked 15 minutes later and was 144/91. Pt given scheduled nifedipine dose. Normal reflexes, denies all other symptoms of preeclampsia, no clonus. Do you still want Q 4 hour vital signs?    Per Dr. Chamberlain: Ok to continue Q 4 vital signs. Notify MD if systolic bp is >150, or diastolic bp is >100.

## 2023-04-28 ENCOUNTER — PATIENT OUTREACH (OUTPATIENT)
Dept: CARE COORDINATION | Facility: CLINIC | Age: 28
End: 2023-04-28
Payer: COMMERCIAL

## 2023-04-28 NOTE — PROGRESS NOTES
"Clinic Care Coordination Contact  [facility]: Post-Discharge Note  SITUATION                                                      Admission:    Admission Date: 23   Reason for Admission: Post term pregnancy at 42w4d  Pre-eclampsia affecting pregnancy, antepartum (O14.90)  Prolonged rupture of membranes (O42.90)  Post term pregnancy, antepartum condition or complication (O48.0)  Meconium staining (P96.83)  Morbid obesity (H) (E66.01)  Primary  section  Discharge:   Discharge Date: 23  Discharge Diagnosis: Post term pregnancy at 42w4d  Pre-eclampsia affecting pregnancy, antepartum (O14.90)  Prolonged rupture of membranes (O42.90)  Post term pregnancy, antepartum condition or complication (O48.0)  Meconium staining (P96.83)  Morbid obesity (H) (E66.01)  Primary  section   delivery delivered   Gestational hypertension    BACKGROUND                                                      Per hospital discharge summary and inpatient provider notes:    HISTORY OF PRESENT ILLNESS AND HOSPITAL COURSE: Modesta Bill  is a 27 year old,  female who underwent  section at outside hospital and was transferred here for postpartum care and to be near infant in NICU. Postoperative course was remarkable for acute blood loss anemia, hemoglobin santiago at 8.8 from 12.o pre-op this was managed with IVF support and monitoring of vital signs. Also noted to have hypertension without evidence of lab abnormalities managed on nifedipine. Patient is now asymptomatic and vital signs are stable. She denies chest pain, shortness of breath or dizziness. Patient has a headache that comes and goes, no change in vision and no upper abdominal pain.  On the day of discharge patient was tolerating diet, pain was controlled with oral medications, she was voiding and passing gas and had a bm.       ASSESSMENT           Discharge Assessment  How are you doing now that you are home?: Pt states \"I'm " "feeling pretty good, a little sore in the mornings but no HAs or blurred vision.\"  Has checked BP 3 times since d/c, had one reading this morning after eating that was > 160/110, but rechecked it 10-15 minutes later and it was down to 132/99.  States she hasn't had a chance to  medications due to traveling back and forth from home in [place] to NICU at [facility].  Baby's doing well in NICU, improving every day.  Pumping every 2 hours, getting about 90ml between both breasts.  Taking OTC Tylenol and ibuprofen she had at home as she's not been able to  rxs.  Will work on having rxs transferred from [facility] in [facility] to [facility] near [facility] today, and start ASAP.  RN reviewed importance of continuing to monitor BP and start BP medications ASAP.  How are your symptoms? (Red Flag symptoms escalate to triage hotline per guidelines): Improved  Do you feel your condition is stable enough to be safe at home until your provider visit?: Yes  Does the patient have their discharge instructions? : Yes  Does the patient have questions regarding their discharge instructions? : No  Were you started on any new medications or were there changes to any of your previous medications? : Yes  Does the patient have all of their medications?: No (see comment) (States hasn't picked up any rxs due to driving back and forth between home in [place] to [facility] to see baby.  Had rxs tranferred to [facility] in [facility] as insurance wouldn't cover  Pharmacy, will have rx transferred to [facility] near [facility].)  Do you have questions regarding any of your medications? : No  Do you have all of your needed medical supplies or equipment (DME)?  (i.e. oxygen tank, CPAP, cane, etc.): Yes  Discharge follow-up appointment scheduled within 14 calendar days? : No  Is patient agreeable to assistance with scheduling? : No (Pt will call OB clinic to schedule follow up appts in 1 and 6 weeks, per AVS)         Post-op " (Clinicians Only)  Did the patient have surgery or a procedure: Yes ()  Incision: closed;healing  Drainage: No  Bleeding: none  Fever: No  Chills: No  Redness: No  Warmth: No  Swelling: No  Incision site pain: No  Closure: suture  Eating & Drinking: eating and drinking without complaints/concerns  PO Intake: regular diet  Additional Symptoms:  (Denies)  Bowel Function: normal  Date of last BM: 23  Urinary Status: voiding without complaint/concerns      PLAN                                                      Outpatient Plan:      DISCHARGE PLAN:   - Follow up with  MD, in 1 week  - Take medication as prescribed  - Physical activity: As tolerated, no heavy lifting. Pelvic rest.  - Diet:  Regular  - Medication:  Please see MAR  - Warning signs discussed with patient about when to call the clinic/hospital- specifically instructed to self montor Bps at home  - All questions and concerns were answered for the patient prior to discharge.     No future appointments.      For any urgent concerns, please contact our 24 hour nurse triage line: [phone] ([phone])         Yamileth Crespo RN

## 2023-04-30 NOTE — PROGRESS NOTES
Modesta Bill  Gender: female  : 1995  [address]  [place] [address]  [phone] (home)   Medical Record: [id number]  Primary Care Provider: Chikis, Buddy Melendez       [facility]   ?   Discharge Phone Call: Key Words/Key Times     How are you and the baby?   Baby still in the NICU at [facility]. Hoping to meet his feeding goals soon and then his feeding tube can be removed. Hopeful for discharge within the next week. They are able to see baby each day, but then drive home 1.5 hours.     How are feedings going?   She is able to pump  ml every 2-3 hours and NICU is fdg this to the baby.     Voiding & Stooling? yes    Any questions or concerns?   Taking her prescribed procardia every day and BP checks x 1 daily. Today was 144/98. Encouraged her to try to take BP at least twice a day, especially if she starts seeing more elevated numbers or becomes symptomatic. Denies incisional pain. Tired and exhausted; offered encouragement as a new mother  from her baby and encouraged rest as able and assistance with daily household needs.     Follow-up appointment?   No.  Client said she called the clinic herself recently and can't be seen until . This nurse made a plan with her that we would call Dr. Henderson's clinic in the morning and have them return a call to her with a sooner appointment. She was thankful that we would attempt to have her be seen much sooner. This nurse also implied Dr. Henderson may also see her for that 6-8 week follow-up rather than her returning to her midwife at that time.       We want to provide excellent care here at [facility]. Do you have any feedback for us that would help us improve? (forgot to ask this question)    Call back COMMENTS:   Non specific to her stay; mostly discussed how her baby was progressing in the NICU and the need for her to get postpartum care soon.       Attempted Calls:   _________     __________

## 2023-05-02 ENCOUNTER — LACTATION ENCOUNTER (OUTPATIENT)
Age: 28
End: 2023-05-02

## 2023-05-02 NOTE — LACTATION NOTE
This note was copied from a baby's chart.  Lactation consultant to patient room to assist with breastfeeding. Mom states she has been pumping 2-3 oz every 2-3 hours, and she attempted to breast feed 1x  yesterday without latch acheived. Bedside RN states infant has had 50 ml of breast milk prior to this attempt at the breast.    Instructed on cross cradle and football holds for deeper, comfortable latch and effective milk transfer. Infant able to latch with and without nipple shield but is very fussy at breast. Infant will sustain latch with rhythmic sucking for 30 seconds, and then arches back and come off the breast crying.  Encouraged to attempt breastfeeding every feeding on early hunger cues, but instructed to limit attempts to 5-10 minutes in infant very fussy. Mom states understanding.    Anticipatory guidance given on input/output goals, normal feeding volumes in the  period. Reviewed online and outpatient lactation resources for breastfeeding help after discharge. Instructed for mom to call for next breastfeeding attempt.    Answered questions and gave encouragement.

## 2023-05-03 ENCOUNTER — OFFICE VISIT (OUTPATIENT)
Dept: FAMILY MEDICINE | Facility: CLINIC | Age: 28
End: 2023-05-03
Payer: COMMERCIAL

## 2023-05-03 VITALS
RESPIRATION RATE: 20 BRPM | HEART RATE: 88 BPM | HEIGHT: 64 IN | TEMPERATURE: 96.8 F | BODY MASS INDEX: 47.02 KG/M2 | DIASTOLIC BLOOD PRESSURE: 78 MMHG | OXYGEN SATURATION: 98 % | WEIGHT: 275.4 LBS | SYSTOLIC BLOOD PRESSURE: 138 MMHG

## 2023-05-03 DIAGNOSIS — Z87.59 HISTORY OF POSTPARTUM GESTATIONAL HYPERTENSION: ICD-10-CM

## 2023-05-03 DIAGNOSIS — D62 ANEMIA DUE TO BLOOD LOSS, ACUTE: ICD-10-CM

## 2023-05-03 DIAGNOSIS — O14.90 PRE-ECLAMPSIA AFFECTING PREGNANCY, ANTEPARTUM: ICD-10-CM

## 2023-05-03 DIAGNOSIS — Z86.79 HISTORY OF POSTPARTUM GESTATIONAL HYPERTENSION: ICD-10-CM

## 2023-05-03 DIAGNOSIS — Z09 HOSPITAL DISCHARGE FOLLOW-UP: Primary | ICD-10-CM

## 2023-05-03 PROCEDURE — 99212 OFFICE O/P EST SF 10 MIN: CPT | Performed by: FAMILY MEDICINE

## 2023-05-03 ASSESSMENT — ASTHMA QUESTIONNAIRES
ACT_TOTALSCORE: 24
QUESTION_4 LAST FOUR WEEKS HOW OFTEN HAVE YOU USED YOUR RESCUE INHALER OR NEBULIZER MEDICATION (SUCH AS ALBUTEROL): NOT AT ALL
QUESTION_3 LAST FOUR WEEKS HOW OFTEN DID YOUR ASTHMA SYMPTOMS (WHEEZING, COUGHING, SHORTNESS OF BREATH, CHEST TIGHTNESS OR PAIN) WAKE YOU UP AT NIGHT OR EARLIER THAN USUAL IN THE MORNING: NOT AT ALL
QUESTION_2 LAST FOUR WEEKS HOW OFTEN HAVE YOU HAD SHORTNESS OF BREATH: NOT AT ALL
QUESTION_5 LAST FOUR WEEKS HOW WOULD YOU RATE YOUR ASTHMA CONTROL: WELL CONTROLLED
ACT_TOTALSCORE: 24
QUESTION_1 LAST FOUR WEEKS HOW MUCH OF THE TIME DID YOUR ASTHMA KEEP YOU FROM GETTING AS MUCH DONE AT WORK, SCHOOL OR AT HOME: NONE OF THE TIME

## 2023-05-03 ASSESSMENT — PAIN SCALES - GENERAL: PAINLEVEL: NO PAIN (0)

## 2023-05-03 NOTE — PROGRESS NOTES
Assessment & Plan       ICD-10-CM    1. Hospital discharge follow-up  Z09       2.  delivery delivered  O82       3. Pre-eclampsia affecting pregnancy, antepartum  O14.90       4. History of postpartum gestational hypertension  Z87.59     Z86.79          She was admitted to [facility] on 23 for prolonged SROM with meconium staining and postdates pregnancy.  She is well-known to me as I was the one who admitted her for this admission.  Attempted home delivery with midwife and she had SROM for more than 42 hours before presenting to the hospital.  She was post date at 42W4D gestation.  While in the hospital, she was noted to have elevated blood pressure and was treated for preeclampsia with severe features.  No history of high blood pressure.  She was treated magnesium infusion.  Significant meconium staining were also noted.  She then had primary low transverse  section with no complication.  She was treated appropriately with Ancef and Zithromax before the surgery.  She was group B strep unknown.  EBL was about 1000 mL.     She was transferred to the [facility] on day #2 postpartum to be closer to her  as he needed the NICU care.  While at [place], she was treated with Procardia for high blood pressure and was discharged home with Procardia.  She been taking the Procardia 30 mg daily.    Blood pressure at home has been about 120s/90s.  Denied any preeclamptic symptoms.  No problem with taking medication.  Pain is well controlled, not taking the pain med.  Incision healing well.  No chest pain or shortness of breath.  No headache, acute visual change abdominal pain.  No diarrhea or constipation.  No depression or concerns of her safety.  Has a very supportive family.  Been stressed out because she had to drive back and forth to be with her .  He is still in the NICU at [facility].  She feels safe overall.    Reviewed the medical record from [facility]  "Hospital.  Last hemoglobin A1c a week ago 8.8.  Preeclamptic labs have been normal.  Encouraged to continue with the prenatal vitamin and and will start her on iron supplement.  Potential side effect discussed.  Encouraged to increase fiber and water intake to prevent constipation side effect.    The wound is healing as expected.  Continue with the wound care.  Symptoms that need to be seen or call in discussed.  Continue with Tylenol or Motrin for pain.  She is no longer taking the oxycodone.    Discussed about postpartum depression and psychosis.  She has no concern about them.  She was strongly encouraged follow-up immediately if develop any of the symptoms.    Encouraged to continue with breast-feeding/pumping if possible.    Will continue with the Procardia for now.  Continue to monitor her blood pressure closely.  Likely will be able to taper off in a month or so.  She was instructed to let me know if blood pressure persistently above 150/100 or below 110/60.  She was informed that preeclampsia can still occur up to a month after delivery.    Recommended to follow-up with this office or the midwife for postpartum visit at around 6 weeks after delivery.    Discussed briefly about birth control options, planned for natural method and it worked well for her in the past.       MED REC REQUIRED  Post Medication Reconciliation Status: discharge medications reconciled, continue medications without change  BMI:   Estimated body mass index is 47.27 kg/m  as calculated from the following:    Height as of this encounter: 1.626 m (5' 4\").    Weight as of this encounter: 124.9 kg (275 lb 6.4 oz).   Weight management plan: Discussed healthy diet and exercise guidelines        Beck Pena Mai, MD  [facility] JULY Byers is a 27 year old, presenting for the following health issues:  Hypertension and Wound Check (Check c- section site)        5/3/2023     9:41 AM   Additional Questions   Roomed " "by Latasha VELASQUEZ LPN     Wound Check    History of Present Illness       Reason for visit:  Post partum check up    She eats 0-1 servings of fruits and vegetables daily.She consumes 2 sweetened beverage(s) daily.She exercises with enough effort to increase her heart rate 10 to 19 minutes per day.  She exercises with enough effort to increase her heart rate 3 or less days per week.   She is taking medications regularly.           2023    11:00 AM   Post Discharge Outreach   Admission Date 2023   Reason for Admission Post term pregnancy at 42w4d  Pre-eclampsia affecting pregnancy, antepartum (O14.90)  Prolonged rupture of membranes (O42.90)  Post term pregnancy, antepartum condition or complication (O48.0)  Meconium staining (P96.83)  Morbid obesity (H) (E66.01)  Primary  section   Discharge Date 2023   Discharge Diagnosis Post term pregnancy at 42w4d  Pre-eclampsia affecting pregnancy, antepartum (O14.90)  Prolonged rupture of membranes (O42.90)  Post term pregnancy, antepartum condition or complication (O48.0)  Meconium staining (P96.83)  Morbid obesity (H) (E66.01)  Primary  section   delivery delivered   Gestational hypertension   How are you doing now that you are home? Pt states \"I'm feeling pretty good, a little sore in the mornings but no HAs or blurred vision.\"  Has checked BP 3 times since d/c, had one reading this morning after eating that was > 160/110, but rechecked it 10-15 minutes later and it was down to 132/99.  States she hasn't had a chance to  medications due to traveling back and forth from home in [facility] to NICU at [facility].  Baby's doing well in NICU, improving every day.  Pumping every 2 hours, getting about 90ml between both breasts.  Taking OTC Tylenol and ibuprofen she had at home as she's not been able to  rxs.  Will work on having rxs transferred from [facility] to [facility] near [facility] today, and start ASAP.  RN " reviewed importance of continuing to monitor BP and start BP medications ASAP.   How are your symptoms? (Red Flag symptoms escalate to triage hotline per guidelines) Improved   Do you feel your condition is stable enough to be safe at home until your provider visit? Yes   Does the patient have their discharge instructions?  Yes   Does the patient have questions regarding their discharge instructions?  No   Were you started on any new medications or were there changes to any of your previous medications?  Yes   Does the patient have all of their medications? No (see comment)   Do you have questions regarding any of your medications?  No   Do you have all of your needed medical supplies or equipment (DME)?  (i.e. oxygen tank, CPAP, cane, etc.) Yes   Discharge follow-up appointment scheduled within 14 calendar days?  No     Hospital Follow-up Visit:    Hospital/Nursing Home/ Rehab Facility: [facility]  Date of Admission: 23  Date of Discharge: 23  Reason(s) for Admission: , postpartum care, preeclampsia    Was your hospitalization related to COVID-19? No   Problems taking medications regularly:  None  Medication changes since discharge: None  Problems adhering to non-medication therapy:  None    Summary of hospitalization:  [facility] discharge summary reviewed  Diagnostic Tests/Treatments reviewed.  Follow up needed: none  Other Healthcare Providers Involved in Patient s Care:         None  Update since discharge: improved.   Plan of care communicated with patient     Modesta is here today for hospital follow-up.  She was admitted to [facility] on 23 for prolonged SROM with meconium staining and postdates pregnancy.  She is well-known to me as I was the one who admitted her for this admission.  Attempted home delivery with midwife and she had SROM for more than 42 hours before presenting to the hospital.  She was post date at 42W4D  "gestation.  While in the hospital, she was noted to have elevated blood pressure and was treated for preeclampsia with severe features.  No history of high blood pressure.  She was treated magnesium infusion.  Significant meconium staining were also noted.  She then had primary low transverse  section with no complication.  She was treated appropriately with Ancef and Zithromax before the surgery.  She was group B strep unknown.  EBL was about 1000 mL.     She was transferred to the [facility] on day #2 postpartum to be closer to her  as he needed the NICU care.  While at [place] [facility], she was treated with Procardia for high blood pressure and was discharged home with Procardia.  She been taking the Procardia 30 mg daily.    Blood pressure at home has been about 120s/90s.  Denied any preeclamptic symptoms.  No problem with taking medication.  Pain is well controlled, not taking the pain med.  Incision healing well.  No chest pain or shortness of breath.  No headache, acute visual change abdominal pain.  No diarrhea or constipation.  No depression or concerns of her safety.  Has a very supportive family.  Been stressed out because she had to drive back and forth to be with her .  He is still in the NICU at [facility].  She feels safe overall.  No fever or chills.  Bleeding is minimal.  No other concerns.      Review of Systems   Constitutional, HEENT, cardiovascular, pulmonary, GI, , musculoskeletal, neuro, skin, endocrine and psych systems are negative, except as otherwise noted.      Objective    /78 (BP Location: Right arm, Patient Position: Chair)   Pulse 88   Temp 96.8  F (36  C) (Temporal)   Resp 20   Ht 1.626 m (5' 4\")   Wt 124.9 kg (275 lb 6.4 oz)   LMP 2022 (Exact Date)   SpO2 98%   BMI 47.27 kg/m    Body mass index is 47.27 kg/m .  Physical Exam   GENERAL: healthy, alert and no distress  RESP: lungs clear to auscultation - no rales, rhonchi or " wheezes  CV: regular rate and rhythm, no murmur, no peripheral edema   ABDOMEN: soft, nontender, nondistended, no tender with palpation to the fundus.  No palpable masses and bowel sounds normal.  No CVA tenderness.  MS: no gross musculoskeletal defects noted, no edema  SKIN: no suspicious lesions or rashes.  Lower abdominal incision healed as expected, no redness or drainage.  It is dry and clean.  NEURO: Normal strength and tone, mentation intact and speech normal.  Cranial nerves II through XII are intact, DTR +2 throughout.  PSYCH: mentation appears normal, affect normal/bright.  Thoughts are intact, no suicidal/homicidal ideation.    No results found for any visits on 05/03/23.

## 2023-05-07 PROBLEM — O48.0 POST TERM PREGNANCY, ANTEPARTUM CONDITION OR COMPLICATION: Status: RESOLVED | Noted: 2023-04-22 | Resolved: 2023-05-07

## 2023-05-07 RX ORDER — FERROUS SULFATE 325(65) MG
325 TABLET ORAL
Qty: 100 TABLET | Refills: 0 | Status: SHIPPED | OUTPATIENT
Start: 2023-05-07

## 2023-08-13 ENCOUNTER — HEALTH MAINTENANCE LETTER (OUTPATIENT)
Age: 28
End: 2023-08-13

## 2024-04-21 ENCOUNTER — APPOINTMENT (OUTPATIENT)
Dept: ULTRASOUND IMAGING | Facility: CLINIC | Age: 29
End: 2024-04-21
Attending: EMERGENCY MEDICINE
Payer: COMMERCIAL

## 2024-04-21 ENCOUNTER — HOSPITAL ENCOUNTER (EMERGENCY)
Facility: CLINIC | Age: 29
Discharge: HOME | End: 2024-04-21
Attending: EMERGENCY MEDICINE | Admitting: EMERGENCY MEDICINE
Payer: COMMERCIAL

## 2024-04-21 VITALS
SYSTOLIC BLOOD PRESSURE: 147 MMHG | DIASTOLIC BLOOD PRESSURE: 89 MMHG | WEIGHT: 285 LBS | RESPIRATION RATE: 18 BRPM | BODY MASS INDEX: 48.92 KG/M2 | OXYGEN SATURATION: 99 % | TEMPERATURE: 97.7 F | HEART RATE: 88 BPM

## 2024-04-21 DIAGNOSIS — N93.9 VAGINAL SPOTTING: ICD-10-CM

## 2024-04-21 DIAGNOSIS — Z32.01 PREGNANCY TEST POSITIVE: ICD-10-CM

## 2024-04-21 LAB
ABO/RH(D): NORMAL
ANTIBODY SCREEN: NEGATIVE
BASOPHILS # BLD AUTO: 0.1 10E3/UL (ref 0–0.2)
BASOPHILS NFR BLD AUTO: 1 %
EOSINOPHIL # BLD AUTO: 0.5 10E3/UL (ref 0–0.7)
EOSINOPHIL NFR BLD AUTO: 5 %
ERYTHROCYTE [DISTWIDTH] IN BLOOD BY AUTOMATED COUNT: 12.6 % (ref 10–15)
HCG INTACT+B SERPL-ACNC: 1094 MIU/ML
HCT VFR BLD AUTO: 39.6 % (ref 35–47)
HGB BLD-MCNC: 13.4 G/DL (ref 11.7–15.7)
IMM GRANULOCYTES # BLD: 0 10E3/UL
IMM GRANULOCYTES NFR BLD: 0 %
LYMPHOCYTES # BLD AUTO: 3.6 10E3/UL (ref 0.8–5.3)
LYMPHOCYTES NFR BLD AUTO: 38 %
MCH RBC QN AUTO: 29.8 PG (ref 26.5–33)
MCHC RBC AUTO-ENTMCNC: 33.8 G/DL (ref 31.5–36.5)
MCV RBC AUTO: 88 FL (ref 78–100)
MONOCYTES # BLD AUTO: 0.7 10E3/UL (ref 0–1.3)
MONOCYTES NFR BLD AUTO: 7 %
NEUTROPHILS # BLD AUTO: 4.6 10E3/UL (ref 1.6–8.3)
NEUTROPHILS NFR BLD AUTO: 49 %
NRBC # BLD AUTO: 0 10E3/UL
NRBC BLD AUTO-RTO: 0 /100
PLATELET # BLD AUTO: 347 10E3/UL (ref 150–450)
RBC # BLD AUTO: 4.49 10E6/UL (ref 3.8–5.2)
SPECIMEN EXPIRATION DATE: NORMAL
WBC # BLD AUTO: 9.5 10E3/UL (ref 4–11)

## 2024-04-21 PROCEDURE — 76801 OB US < 14 WKS SINGLE FETUS: CPT

## 2024-04-21 PROCEDURE — 36415 COLL VENOUS BLD VENIPUNCTURE: CPT | Performed by: EMERGENCY MEDICINE

## 2024-04-21 PROCEDURE — 99284 EMERGENCY DEPT VISIT MOD MDM: CPT | Mod: 25 | Performed by: EMERGENCY MEDICINE

## 2024-04-21 PROCEDURE — 85025 COMPLETE CBC W/AUTO DIFF WBC: CPT | Performed by: EMERGENCY MEDICINE

## 2024-04-21 PROCEDURE — 99284 EMERGENCY DEPT VISIT MOD MDM: CPT | Performed by: EMERGENCY MEDICINE

## 2024-04-21 PROCEDURE — 86900 BLOOD TYPING SEROLOGIC ABO: CPT | Performed by: EMERGENCY MEDICINE

## 2024-04-21 PROCEDURE — 84702 CHORIONIC GONADOTROPIN TEST: CPT | Performed by: EMERGENCY MEDICINE

## 2024-04-21 ASSESSMENT — ACTIVITIES OF DAILY LIVING (ADL)
ADLS_ACUITY_SCORE: 35

## 2024-04-21 ASSESSMENT — COLUMBIA-SUICIDE SEVERITY RATING SCALE - C-SSRS
1. IN THE PAST MONTH, HAVE YOU WISHED YOU WERE DEAD OR WISHED YOU COULD GO TO SLEEP AND NOT WAKE UP?: NO
6. HAVE YOU EVER DONE ANYTHING, STARTED TO DO ANYTHING, OR PREPARED TO DO ANYTHING TO END YOUR LIFE?: NO
2. HAVE YOU ACTUALLY HAD ANY THOUGHTS OF KILLING YOURSELF IN THE PAST MONTH?: NO

## 2024-04-21 NOTE — ED PROVIDER NOTES
History     Chief Complaint   Patient presents with    Vaginal Bleeding     HPI  Modesta Bill is a 28 year old  female who presents to the emergency department secondary to vaginal bleeding.  She is uncertain as to when her last menstrual cycle was but she believes she is about 4 weeks pregnant at this time.  Vaginal spotting small amounts started this morning, noted in her underwear.  Did not saturate a pad.  No abd pain now.  Blood type is O pos. She has intermittent cramping.  She had a positive pregnancy test last week.    Allergies:  Allergies   Allergen Reactions    Other Environmental Allergy Cough     Sneezing with pollen  Skin irritation with Cat    Hydrocodone-Acetaminophen Dizziness       Problem List:    Patient Active Problem List    Diagnosis Date Noted    Gestational hypertension 2023     Priority: Medium     delivery delivered 2023     Priority: Medium    Prolonged rupture of membranes 2023     Priority: Medium    Meconium staining 2023     Priority: Medium    Pre-eclampsia affecting pregnancy, antepartum 2023     Priority: Medium    Acanthosis nigricans 2013     Priority: Medium    Morbid obesity (H) 2013     Priority: Medium    Varicella 2008     Priority: Medium     Formatting of this note might be different from the original.  hx      Asthma 2008     Priority: Medium        Past Medical History:    Past Medical History:   Diagnosis Date    Asthma     Post term pregnancy, antepartum condition or complication 2023       Past Surgical History:    Past Surgical History:   Procedure Laterality Date     SECTION N/A 2023    Procedure:  SECTION;  Surgeon: Beck Henderson MD;  Location:  L+D    ENT SURGERY      MYRINGOTOMY         Family History:    Family History   Problem Relation Age of Onset    Seizure Disorder Mother     Unknown/Adopted Father     No Known Problems Son        Social  History:  Marital Status:   [2]  Social History     Tobacco Use    Smoking status: Former     Types: Cigarettes    Smokeless tobacco: Former   Vaping Use    Vaping status: Never Used   Substance Use Topics    Alcohol use: Not Currently    Drug use: Never        Medications:    albuterol (PROAIR HFA/PROVENTIL HFA/VENTOLIN HFA) 108 (90 Base) MCG/ACT inhaler  beclomethasone HFA (QVAR REDIHALER) 80 MCG/ACT inhaler  ferrous sulfate (FEROSUL) 325 (65 Fe) MG tablet  ibuprofen (ADVIL/MOTRIN) 800 MG tablet  NIFEdipine ER (ADALAT CC) 30 MG 24 hr tablet  Prenatal Vit-Fe Fumarate-FA (PNV PRENATAL PLUS MULTIVITAMIN) 27-1 MG TABS per tablet          Review of Systems   All other systems reviewed and are negative.      Physical Exam   BP: (!) 147/89  Pulse: 88  Temp: 97.7  F (36.5  C)  Resp: 18  Weight: 129.3 kg (285 lb)  SpO2: 99 %      Physical Exam  Vitals and nursing note reviewed.   Constitutional:       General: She is not in acute distress.     Appearance: Normal appearance. She is well-developed.   HENT:      Head: Normocephalic and atraumatic.      Right Ear: External ear normal.      Left Ear: External ear normal.      Nose: Nose normal.      Mouth/Throat:      Mouth: Mucous membranes are moist.   Eyes:      General: No scleral icterus.     Extraocular Movements: Extraocular movements intact.      Conjunctiva/sclera: Conjunctivae normal.      Pupils: Pupils are equal, round, and reactive to light.   Cardiovascular:      Rate and Rhythm: Normal rate.   Pulmonary:      Effort: Pulmonary effort is normal. No respiratory distress.   Abdominal:      General: Abdomen is flat.      Tenderness: There is abdominal tenderness.      Comments: Mild right pelvic ttp    Musculoskeletal:         General: Normal range of motion.      Cervical back: Normal range of motion and neck supple.   Skin:     General: Skin is warm and dry.      Findings: No rash.   Neurological:      General: No focal deficit present.      Mental Status:  She is alert and oriented to person, place, and time.   Psychiatric:         Mood and Affect: Mood normal.         ED Course        Procedures                  Results for orders placed or performed during the hospital encounter of 04/21/24 (from the past 24 hour(s))   ABO/Rh type and screen    Narrative    The following orders were created for panel order ABO/Rh type and screen.  Procedure                               Abnormality         Status                     ---------                               -----------         ------                     Adult Type and Screen[[id number]]                            Final result                 Please view results for these tests on the individual orders.   HCG quantitative pregnancy (blood)   Result Value Ref Range    hCG Quantitative 1,094 (H) <5 mIU/mL   CBC with platelets differential    Narrative    The following orders were created for panel order CBC with platelets differential.  Procedure                               Abnormality         Status                     ---------                               -----------         ------                     CBC with platelets and d...[[id number]]                      Final result                 Please view results for these tests on the individual orders.   Adult Type and Screen   Result Value Ref Range    ABO/RH(D) O POS     Antibody Screen Negative Negative    SPECIMEN EXPIRATION DATE [id number]    CBC with platelets and differential   Result Value Ref Range    WBC Count 9.5 4.0 - 11.0 10e3/uL    RBC Count 4.49 3.80 - 5.20 10e6/uL    Hemoglobin 13.4 11.7 - 15.7 g/dL    Hematocrit 39.6 35.0 - 47.0 %    MCV 88 78 - 100 fL    MCH 29.8 26.5 - 33.0 pg    MCHC 33.8 31.5 - 36.5 g/dL    RDW 12.6 10.0 - 15.0 %    Platelet Count 347 150 - 450 10e3/uL    % Neutrophils 49 %    % Lymphocytes 38 %    % Monocytes 7 %    % Eosinophils 5 %    % Basophils 1 %    % Immature Granulocytes 0 %    NRBCs per 100 WBC 0 <1 /100    Absolute  Neutrophils 4.6 1.6 - 8.3 10e3/uL    Absolute Lymphocytes 3.6 0.8 - 5.3 10e3/uL    Absolute Monocytes 0.7 0.0 - 1.3 10e3/uL    Absolute Eosinophils 0.5 0.0 - 0.7 10e3/uL    Absolute Basophils 0.1 0.0 - 0.2 10e3/uL    Absolute Immature Granulocytes 0.0 <=0.4 10e3/uL    Absolute NRBCs 0.0 10e3/uL   OB < 14 weeks single or first gestation US    Narrative    EXAM: US OB < 14 WEEKS SINGLE-TRANSABDOMINAL  LOCATION: [facility]  DATE: 2024    INDICATION: vaginal bleeding early pregnancy  COMPARISON: Ultrasound 2022  TECHNIQUE: Transabdominal scans were performed. Endovaginal ultrasound was performed to better visualize the embryo.    FINDINGS:  UTERUS: Abnormal thickened endometrium measuring 31 mm in thickness. Trace fluid within the upper endometrial cavity. No intrauterine gestational sac.    RIGHT OVARY: Collapsing 1.8 cm hemorrhagic corpus luteal cyst. Simple 1.8 cm right paraovarian cyst which is likely an incidental benign finding.  LEFT OVARY: Normal.      Impression    IMPRESSION:   1.  Abnormal thickened uterine endometrium. No intrauterine gestational sac is identified. Differential considerations include a recent spontaneous , early nonvisualized intrauterine pregnancy and nonvisualized ectopic pregnancy. Recommend   follow-up with serial beta hCG levels and/or ultrasound.           Medications - No data to display    Assessments & Plan (with Medical Decision Making)  28 yr old G2, P1 approximately 4 weeks pregnancy who presents emergency department secondary to vaginal spotting.  No significant pain.  No other symptoms.  No vomiting or fever.  No previous history of ectopic pregnancy.  Per report blood type is O+.  Has not had RhoGAM in the past.  We will get pregnancy ultrasound, beta-hCG and confirm her blood type.  Patient in agreement with this plan.  Will reevaluate afterwards.  Ultrasound shows no intrauterine pregnancy.  Differential diagnosis includes  spontaneous /miscarriage, ectopic pregnancy that cannot be seen.  Recommend outpatient follow-up beta-hCG in 2 days and ultrasound as needed.  Patient was advised to follow-up in the clinic in 2 days.  Return to ER precautions and follow-up precautions discussed.  All questions answered prior to discharge.     I have reviewed the nursing notes.    I have reviewed the findings, diagnosis, plan and need for follow up with the patient.          Discharge Medication List as of 2024 12:49 PM          Final diagnoses:   Vaginal spotting   Pregnancy test positive       2024   [facility] EMERGENCY DEPT       Vern Douglas MD  24 [phone]

## 2024-04-21 NOTE — DISCHARGE INSTRUCTIONS
As discussed your ultrasound did not show an intrauterine pregnancy at this time.  Conservative management in the situation is recommended.  Repeat beta-hCG in a couple of days with your provider.  You also need a repeat exam and possibly follow-up ultrasound depending on the results of the beta-hCG.  It could be that you had a miscarriage but it is also possible there is an ectopic pregnancy that cannot be seen.  That being said return to the emergency department if you develop increasing pain vaginal bleeding or other new symptoms.  I hope you recover quickly.  It was a pleasure to meet you.

## 2024-04-21 NOTE — ED NOTES
Pt unsure of when her last menstrual cycle was. Believes she is about 4 weeks pregnant at this time.

## 2024-04-21 NOTE — ED TRIAGE NOTES
PT had positive pregnancy test last week. Today c/o abd cramping and spotting.      Triage Assessment (Adult)       Row Name 04/21/24 0938          Triage Assessment    Airway WDL WDL        Respiratory WDL    Respiratory WDL WDL        Cardiac WDL    Cardiac WDL WDL

## 2024-10-06 ENCOUNTER — HEALTH MAINTENANCE LETTER (OUTPATIENT)
Age: 29
End: 2024-10-06

## (undated) DEVICE — ESU PENCIL SMOKE EVAC W/ROCKER SWITCH 0703-047-000

## (undated) DEVICE — SOL NACL 0.9% IRRIG 1000ML BOTTLE 07138-09

## (undated) DEVICE — PACK C-SECTION

## (undated) DEVICE — GLOVE PROTEXIS W/NEU-THERA 7.5  2D73TE75

## (undated) DEVICE — SOL WATER IRRIG 1000ML BOTTLE 2F7114

## (undated) DEVICE — ADH SKIN CLOSURE PREMIERPRO EXOFIN 1.0ML 3470

## (undated) DEVICE — SUCTION MANIFOLD NEPTUNE 2 SYS 4 PORT 0702-020-000

## (undated) DEVICE — STPL SKIN SUBCUTICULAR INSORB 2025

## (undated) DEVICE — CATH TRAY FOLEY 16FR DRAINAGE BAG STATLOCK 899916

## (undated) DEVICE — CLAMP CORD

## (undated) DEVICE — SU MONOCRYL 0 CT-1 36" UND Y946H

## (undated) DEVICE — STOCKING SLEEVE COMPRESSION CALF LG

## (undated) DEVICE — PREP CHLORAPREP 26ML TINTED ORANGE  260815

## (undated) RX ORDER — FENTANYL CITRATE-0.9 % NACL/PF 10 MCG/ML
PLASTIC BAG, INJECTION (ML) INTRAVENOUS
Status: DISPENSED
Start: 2023-04-22

## (undated) RX ORDER — OXYTOCIN 10 [USP'U]/ML
INJECTION, SOLUTION INTRAMUSCULAR; INTRAVENOUS
Status: DISPENSED
Start: 2023-04-22

## (undated) RX ORDER — FENTANYL CITRATE 50 UG/ML
INJECTION, SOLUTION INTRAMUSCULAR; INTRAVENOUS
Status: DISPENSED
Start: 2023-04-22

## (undated) RX ORDER — KETOROLAC TROMETHAMINE 30 MG/ML
INJECTION, SOLUTION INTRAMUSCULAR; INTRAVENOUS
Status: DISPENSED
Start: 2023-04-22

## (undated) RX ORDER — MORPHINE SULFATE 1 MG/ML
INJECTION, SOLUTION EPIDURAL; INTRATHECAL; INTRAVENOUS
Status: DISPENSED
Start: 2023-04-22

## (undated) RX ORDER — ONDANSETRON 2 MG/ML
INJECTION INTRAMUSCULAR; INTRAVENOUS
Status: DISPENSED
Start: 2023-04-22

## (undated) RX ORDER — BUPIVACAINE HYDROCHLORIDE 2.5 MG/ML
INJECTION, SOLUTION EPIDURAL; INFILTRATION; INTRACAUDAL
Status: DISPENSED
Start: 2023-04-22